# Patient Record
Sex: MALE | Race: WHITE | Employment: FULL TIME | URBAN - METROPOLITAN AREA
[De-identification: names, ages, dates, MRNs, and addresses within clinical notes are randomized per-mention and may not be internally consistent; named-entity substitution may affect disease eponyms.]

---

## 2018-01-19 ENCOUNTER — HOSPITAL ENCOUNTER (OUTPATIENT)
Age: 63
Setting detail: OBSERVATION
Discharge: HOME | End: 2018-01-19
Attending: EMERGENCY MEDICINE | Admitting: INTERNAL MEDICINE
Payer: SELF-PAY

## 2018-01-19 ENCOUNTER — APPOINTMENT (OUTPATIENT)
Dept: GENERAL RADIOLOGY | Age: 63
End: 2018-01-19
Attending: EMERGENCY MEDICINE
Payer: SELF-PAY

## 2018-01-19 VITALS
HEIGHT: 72 IN | OXYGEN SATURATION: 96 % | DIASTOLIC BLOOD PRESSURE: 70 MMHG | BODY MASS INDEX: 31.83 KG/M2 | WEIGHT: 235 LBS | HEART RATE: 54 BPM | TEMPERATURE: 97.7 F | RESPIRATION RATE: 30 BRPM | SYSTOLIC BLOOD PRESSURE: 110 MMHG

## 2018-01-19 DIAGNOSIS — R06.09 DOE (DYSPNEA ON EXERTION): ICD-10-CM

## 2018-01-19 DIAGNOSIS — R07.9 CHEST PAIN, UNSPECIFIED TYPE: Primary | ICD-10-CM

## 2018-01-19 LAB
ALBUMIN SERPL-MCNC: 3.8 G/DL (ref 3.4–5)
ALBUMIN/GLOB SERPL: 1.2 {RATIO} (ref 0.8–1.7)
ALP SERPL-CCNC: 79 U/L (ref 45–117)
ALT SERPL-CCNC: 28 U/L (ref 16–61)
ANION GAP SERPL CALC-SCNC: 1 MMOL/L (ref 3–18)
AST SERPL-CCNC: 18 U/L (ref 15–37)
BASOPHILS # BLD: 0.1 K/UL (ref 0–0.1)
BASOPHILS NFR BLD: 1 % (ref 0–2)
BILIRUB SERPL-MCNC: 0.9 MG/DL (ref 0.2–1)
BNP SERPL-MCNC: 235 PG/ML (ref 0–900)
BUN SERPL-MCNC: 17 MG/DL (ref 7–18)
BUN/CREAT SERPL: 18 (ref 12–20)
CALCIUM SERPL-MCNC: 8.8 MG/DL (ref 8.5–10.1)
CHLORIDE SERPL-SCNC: 106 MMOL/L (ref 100–108)
CK MB CFR SERPL CALC: 3.6 % (ref 0–4)
CK MB CFR SERPL CALC: ABNORMAL % (ref 0–4)
CK MB SERPL-MCNC: 1 NG/ML (ref 5–25)
CK MB SERPL-MCNC: <1 NG/ML (ref 5–25)
CK SERPL-CCNC: 28 U/L (ref 39–308)
CK SERPL-CCNC: 34 U/L (ref 39–308)
CO2 SERPL-SCNC: 34 MMOL/L (ref 21–32)
CREAT SERPL-MCNC: 0.96 MG/DL (ref 0.6–1.3)
DIFFERENTIAL METHOD BLD: ABNORMAL
EOSINOPHIL # BLD: 0.3 K/UL (ref 0–0.4)
EOSINOPHIL NFR BLD: 4 % (ref 0–5)
ERYTHROCYTE [DISTWIDTH] IN BLOOD BY AUTOMATED COUNT: 12.9 % (ref 11.6–14.5)
GLOBULIN SER CALC-MCNC: 3.3 G/DL (ref 2–4)
GLUCOSE SERPL-MCNC: 103 MG/DL (ref 74–99)
HCT VFR BLD AUTO: 50.8 % (ref 36–48)
HGB BLD-MCNC: 17.5 G/DL (ref 13–16)
INR PPP: 1 (ref 0.8–1.2)
LYMPHOCYTES # BLD: 1.6 K/UL (ref 0.9–3.6)
LYMPHOCYTES NFR BLD: 21 % (ref 21–52)
MAGNESIUM SERPL-MCNC: 2 MG/DL (ref 1.6–2.6)
MCH RBC QN AUTO: 31.8 PG (ref 24–34)
MCHC RBC AUTO-ENTMCNC: 34.4 G/DL (ref 31–37)
MCV RBC AUTO: 92.2 FL (ref 74–97)
MONOCYTES # BLD: 0.6 K/UL (ref 0.05–1.2)
MONOCYTES NFR BLD: 8 % (ref 3–10)
NEUTS SEG # BLD: 5.1 K/UL (ref 1.8–8)
NEUTS SEG NFR BLD: 66 % (ref 40–73)
PLATELET # BLD AUTO: 195 K/UL (ref 135–420)
PMV BLD AUTO: 11.6 FL (ref 9.2–11.8)
POTASSIUM SERPL-SCNC: 4.3 MMOL/L (ref 3.5–5.5)
PROT SERPL-MCNC: 7.1 G/DL (ref 6.4–8.2)
PROTHROMBIN TIME: 12.9 SEC (ref 11.5–15.2)
RBC # BLD AUTO: 5.51 M/UL (ref 4.7–5.5)
SODIUM SERPL-SCNC: 141 MMOL/L (ref 136–145)
TROPONIN I SERPL-MCNC: <0.02 NG/ML (ref 0–0.04)
TROPONIN I SERPL-MCNC: <0.02 NG/ML (ref 0–0.04)
WBC # BLD AUTO: 7.7 K/UL (ref 4.6–13.2)

## 2018-01-19 PROCEDURE — 71045 X-RAY EXAM CHEST 1 VIEW: CPT

## 2018-01-19 PROCEDURE — 93005 ELECTROCARDIOGRAM TRACING: CPT

## 2018-01-19 PROCEDURE — 85025 COMPLETE CBC W/AUTO DIFF WBC: CPT | Performed by: EMERGENCY MEDICINE

## 2018-01-19 PROCEDURE — 85610 PROTHROMBIN TIME: CPT | Performed by: EMERGENCY MEDICINE

## 2018-01-19 PROCEDURE — 74011636320 HC RX REV CODE- 636/320: Performed by: INTERNAL MEDICINE

## 2018-01-19 PROCEDURE — 82553 CREATINE MB FRACTION: CPT | Performed by: EMERGENCY MEDICINE

## 2018-01-19 PROCEDURE — 99218 HC RM OBSERVATION: CPT

## 2018-01-19 PROCEDURE — 99152 MOD SED SAME PHYS/QHP 5/>YRS: CPT

## 2018-01-19 PROCEDURE — 99285 EMERGENCY DEPT VISIT HI MDM: CPT

## 2018-01-19 PROCEDURE — 74011250637 HC RX REV CODE- 250/637: Performed by: EMERGENCY MEDICINE

## 2018-01-19 PROCEDURE — 83880 ASSAY OF NATRIURETIC PEPTIDE: CPT | Performed by: EMERGENCY MEDICINE

## 2018-01-19 PROCEDURE — 83735 ASSAY OF MAGNESIUM: CPT | Performed by: EMERGENCY MEDICINE

## 2018-01-19 PROCEDURE — 80053 COMPREHEN METABOLIC PANEL: CPT | Performed by: EMERGENCY MEDICINE

## 2018-01-19 PROCEDURE — 74011000250 HC RX REV CODE- 250: Performed by: INTERNAL MEDICINE

## 2018-01-19 PROCEDURE — 74011250636 HC RX REV CODE- 250/636: Performed by: INTERNAL MEDICINE

## 2018-01-19 RX ORDER — ASPIRIN 325 MG
325 TABLET ORAL
Status: COMPLETED | OUTPATIENT
Start: 2018-01-19 | End: 2018-01-19

## 2018-01-19 RX ORDER — BIVALIRUDIN 250 MG/5ML
0.75 INJECTION, POWDER, LYOPHILIZED, FOR SOLUTION INTRAVENOUS ONCE
Status: DISCONTINUED | OUTPATIENT
Start: 2018-01-19 | End: 2018-01-19 | Stop reason: HOSPADM

## 2018-01-19 RX ORDER — MIDAZOLAM HYDROCHLORIDE 1 MG/ML
.5-2 INJECTION, SOLUTION INTRAMUSCULAR; INTRAVENOUS
Status: DISCONTINUED | OUTPATIENT
Start: 2018-01-19 | End: 2018-01-19 | Stop reason: HOSPADM

## 2018-01-19 RX ORDER — ATORVASTATIN CALCIUM 20 MG/1
20 TABLET, FILM COATED ORAL DAILY
Qty: 30 TAB | Refills: 2 | Status: SHIPPED | OUTPATIENT
Start: 2018-01-19 | End: 2018-01-23 | Stop reason: SDUPTHER

## 2018-01-19 RX ORDER — FENTANYL CITRATE 50 UG/ML
12.5-1 INJECTION, SOLUTION INTRAMUSCULAR; INTRAVENOUS
Status: DISCONTINUED | OUTPATIENT
Start: 2018-01-19 | End: 2018-01-19 | Stop reason: HOSPADM

## 2018-01-19 RX ORDER — VERAPAMIL HYDROCHLORIDE 2.5 MG/ML
2.5-5 INJECTION, SOLUTION INTRAVENOUS ONCE
Status: COMPLETED | OUTPATIENT
Start: 2018-01-19 | End: 2018-01-19

## 2018-01-19 RX ORDER — SODIUM CHLORIDE 0.9 % (FLUSH) 0.9 %
5-10 SYRINGE (ML) INJECTION EVERY 8 HOURS
Status: DISCONTINUED | OUTPATIENT
Start: 2018-01-19 | End: 2018-01-19 | Stop reason: HOSPADM

## 2018-01-19 RX ORDER — ISOSORBIDE MONONITRATE 30 MG/1
30 TABLET, EXTENDED RELEASE ORAL DAILY
Qty: 30 TAB | Refills: 2 | Status: SHIPPED | OUTPATIENT
Start: 2018-01-19 | End: 2018-01-23 | Stop reason: SDUPTHER

## 2018-01-19 RX ORDER — HEPARIN SODIUM 1000 [USP'U]/ML
1000-10000 INJECTION, SOLUTION INTRAVENOUS; SUBCUTANEOUS
Status: DISCONTINUED | OUTPATIENT
Start: 2018-01-19 | End: 2018-01-19 | Stop reason: HOSPADM

## 2018-01-19 RX ORDER — SODIUM CHLORIDE 0.9 % (FLUSH) 0.9 %
5-10 SYRINGE (ML) INJECTION AS NEEDED
Status: DISCONTINUED | OUTPATIENT
Start: 2018-01-19 | End: 2018-01-19 | Stop reason: HOSPADM

## 2018-01-19 RX ORDER — HEPARIN SODIUM 200 [USP'U]/100ML
500 INJECTION, SOLUTION INTRAVENOUS ONCE
Status: COMPLETED | OUTPATIENT
Start: 2018-01-19 | End: 2018-01-19

## 2018-01-19 RX ORDER — LIDOCAINE HYDROCHLORIDE 10 MG/ML
1-30 INJECTION, SOLUTION EPIDURAL; INFILTRATION; INTRACAUDAL; PERINEURAL
Status: DISCONTINUED | OUTPATIENT
Start: 2018-01-19 | End: 2018-01-19 | Stop reason: HOSPADM

## 2018-01-19 RX ADMIN — HEPARIN SODIUM 3000 UNITS: 1000 INJECTION, SOLUTION INTRAVENOUS; SUBCUTANEOUS at 16:21

## 2018-01-19 RX ADMIN — NITROGLYCERIN 200 MCG: 5 INJECTION, SOLUTION INTRAVENOUS at 16:19

## 2018-01-19 RX ADMIN — VERAPAMIL HYDROCHLORIDE 5 MG: 2.5 INJECTION INTRAVENOUS at 16:19

## 2018-01-19 RX ADMIN — HEPARIN SODIUM IN SODIUM CHLORIDE 1000 UNITS: 200 INJECTION INTRAVENOUS at 16:11

## 2018-01-19 RX ADMIN — LIDOCAINE HYDROCHLORIDE 3 ML: 10 INJECTION, SOLUTION EPIDURAL; INFILTRATION; INTRACAUDAL; PERINEURAL at 16:18

## 2018-01-19 RX ADMIN — FENTANYL CITRATE 25 MCG: 50 INJECTION INTRAMUSCULAR; INTRAVENOUS at 16:18

## 2018-01-19 RX ADMIN — MIDAZOLAM HYDROCHLORIDE 1 MG: 1 INJECTION, SOLUTION INTRAMUSCULAR; INTRAVENOUS at 16:18

## 2018-01-19 RX ADMIN — IOPAMIDOL 95 ML: 612 INJECTION, SOLUTION INTRAVENOUS at 16:41

## 2018-01-19 RX ADMIN — NITROGLYCERIN 200 MCG: 5 INJECTION, SOLUTION INTRAVENOUS at 16:27

## 2018-01-19 RX ADMIN — ASPIRIN 325 MG ORAL TABLET 325 MG: 325 PILL ORAL at 12:47

## 2018-01-19 NOTE — IP AVS SNAPSHOT
[address] Patient: Dali Suarez MRN: [id number]:[id number] A check klarissa indicates which time of day the medication should be taken. My Medications START taking these medications Instructions Each Dose to Equal  
 Morning Noon Evening Bedtime  
 atorvastatin 20 mg tablet Commonly known as:  LIPITOR Your last dose was: Your next dose is: Take 1 Tab by mouth daily. 20 mg  
    
   
   
   
  
 isosorbide mononitrate ER 30 mg tablet Commonly known as:  IMDUR Your last dose was: Your next dose is: Take 1 Tab by mouth daily. 30 mg  
    
   
   
   
  
  
STOP taking these medications   
 lisinopril 5 mg tablet Commonly known as:  PRINIVIL, ZESTRIL  
   
  
 ondansetron hcl 4 mg tablet Commonly known as:  Gopi Alatorre Where to Get Your Medications Information on where to get these meds will be given to you by the nurse or doctor. ! Ask your nurse or doctor about these medications  
  atorvastatin 20 mg tablet  
 isosorbide mononitrate ER 30 mg tablet

## 2018-01-19 NOTE — H&P
Attestation signed by Libby Garcia MD at 01/19/18 1538 (Updated)   Seen and evaluated. Agree with below.     Difficult situation as patient was seen by his regular cardiologist in September with recommended heart catheterization. He had declined because of financial reasons. He now presents 3-4 months later with recurrent anginal equivalent, severe shortness of breath and dyspnea on exertion with unstable pattern.     Because of the fact that his previous cardiologist had recommended heart catheterization, I discussed with him about his options. Obviously, increasing medical therapy would be beneficial.  Because of his bradycardia, he cannot tolerate any significant increase in his beta-blocker therapy. He remains on aspirin. He should also be on aggressive statin therapy.     After lengthy discussion of 30 minutes, he wishes to proceed with heart catheterization and coronary angiography at this time. His plan was to initially wait until September 2018 when Medicare kicked in for him. However, both he and his family are concerned that he has aggressive, progressive disease in his coronary arteries. All questions were answered. Expand All Collapse All    []Hide copied text  [facility] - Consult Note     Consultation request by No admitting provider for patient encounter. for advice/opinion related to evaluating shortness of breath and dyspnea on exertion.      Date of  Admission: 1/19/2018 11:29 AM                   Primary Care Physician:  PROVIDER UNKNOWN      Assessment:      -SOB/WHIPPLE, concern for anginal equivalent with hx of CAD. Prior PCI to prox LAD in 2006. Cardiac cath in 2011 noted restenosis to prior LAD stent. Pt had successful angioplasty and PCI to vessel. Last cath 2014 showed widely patent stent. Initial Troponin negative. EKG without ischemic changes. Hemodynamics stable.    -HTN, on atenolol 25mg BID  -HLD, is supposed to be taking Lipitor 20mg  -Anxiety disorder, on Xanax  -Non-compliance d/t financial constraints, lost his insurance this past year     Primary cardiologist Dr Adam Castaneda with [facility]:      -Symptoms consistent with angina given patient's history.   -Trend enzymes.   -Serial EKGs. -Would continue BB.  -Would check lipid panel and resume statin therapy.   -Continue 81mg aspirin daily.   -Will discuss case with attending. If patient was to have cardiac cath, it would not be able to be completed until Monday. Further recs pending his evaluation.      History of Present Illness:      This is a 57 y.o. male admitted for There are no admission diagnoses documented for this encounter. .    Patient is a 54yo  male with PMHx of CAD, HTN, HLD, and anxiety who presented with worsening sob and whipple. Both have been progressing since Aug 2017. Pt saw his primary cardiologist Dr Adam Castaneda at that time for which Dr Adam Castaneda recommended patient have a repeat cardiac catheterization. Since patient does not currently have insurance he inquired to the billing office regarding cost of procedure. He decided against proceeding with cardiac cath as cost would be upwards of $75k. Pt told his cardiologist he would like to wait until Sept 2018 when his Medicare would be starting. Pt states his shortness of breath and WHIPPLE have been worsening. He also has been having \"indigestion\" type symptoms and orthopnea. Pt is taking atenolol 25mg bid. He is taking his own regimen of aspirin at 162mg in the morning and 81mg in the evening. He states he was never directed by Dr Adam Castaneda how much aspirin he should be taking. Pt states since his symptoms are worsening and his family has been adamant that he move forward with cardiac cath, he is willing to have the cath now if necessary. He would prefer to absorb the risk and try medical management if he can wait until Sept 2018 to have his cardiac cath. Pt had a cardiac cath in 2011 noting restenosis to prior LAD stent.  Pt had successful angioplasty and PCI to vessel. Pt was sent to the ED by his employer today due to worsening shortness of breath and dyspnea. 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 He does not do a lot of pushing, pulling, or lifting.      Medications:  -Atenolol 25mg BID  -Aspirin 162mg every morning and 81mg every evening.     Cardiac cath 4/27/14 Dr Dunia Mckenna:  DIAGNOSES:  1. Angiographic evidence of widely patent left anterior descending stent with no residual stenosis. 2.Angiographic evidence of 30% proximal right coronary stenosis. 3.Angiographic evidence of 20% mid left anterior descending stenosis. 4.Angiographic evidence of well-preserved left ventricular function. IMPRESSION AND RECOMMENDATIONS:  The patient has been reassured of these findings and will be continued on medical therapy and risk factor modification.     Cardiac cath 7/21/2011 Dr Dunia Mckenna:   1. Left main coronary: The left main coronary artery was patent with no significant stenosis. 2. Circumflex coronary: Circumflex vessel coursed along the   posterior atrioventricular groove, supplying flow to a moderate   caliber marginal branch. The circumflex was widely patent. There were no areas of stenosis throughout its course. 3. Left anterior descending coronary: Left anterior descending vessel coursed along the anterior intraventricular groove to the left ventricular apex. There was angiographic evidence of a 95%   in-stent stenosis of the proximal LAD coronary artery. The   remainder of the vessel was free of any significant disease. 4. Right coronary angiogram: The right coronary artery coursed along the anterior atrioventricular groove, terminating in a   moderate posterior descending branch. There was a noncritical 10% stenosis in the proximal right coronary artery. It was otherwise widely patent and free of any significant disease.     Echocardiogram 11/13/2010  NORMAL GLOBAL LEFT VENTRICULAR SYSTOLIC FUNCTION. EJECTION FRACTION IS 67%. MILDLY DILATED LEFT ATRIUM. MILD MITRAL AND PULMONIC REGURGITATION. TRACE TRICUSPID REGURGITATION. NORMAL PULMONARY ARTERY PRESSURE. PREVIOUS ECHO DATED 1/6/2006 FOR COMPARISON.     Cardiac risk factors: Known CAD, HTN, HLD        Review of Symptoms:  Except as stated above include:  Constitutional:  Negative for fevers or chills  Respiratory:  Positive for sob and sandra, negative for cough or congestion  Cardiovascular:  Positive for chest tightness and orthopnea  Gastrointestinal: positive for indigestion, negative for nausea and vomiting  Genitourinary:  Negative for hematuria or dysuria  Musculoskeletal:  Negative for weakness or falls  Neurological:  Negative for dizziness or weakness  Dermatological:  Negative for rashes or itching  Endocrinological: Negative  Psychological:  Negative for anxiety or depression      Past Medical History:           Past Medical History:   Diagnosis Date    Atrial fibrillation ([facility] [address]      Coronary stent            Social History:       Social History                Social History    Marital status:        Spouse name: N/A    Number of children: N/A    Years of education: N/A             Social History Main Topics    Smoking status: Current Every Day Smoker       Packs/day: 1.00    Smokeless tobacco: Not on file    Alcohol use 0.5 oz/week        1 Shots of liquor per week     Drug use: No    Sexual activity: Not on file           Other Topics Concern    Not on file          Social History Narrative    No narrative on file             Family History:   No family history on file.      Medications:           Allergies   Allergen Reactions    Penicillins Hives    Codeine Sulfate Rash         No current facility-administered medications for this encounter.            Current Outpatient Prescriptions   Medication Sig    lisinopril (PRINIVIL, ZESTRIL) 5 mg tablet Take 5 mg by mouth daily.     ondansetron hcl (ZOFRAN) 4 mg tablet Take 1 Tab by mouth every eight (8) hours as needed for Nausea for 10 doses.          Physical Exam:           Visit Vitals    BP (!) 152/97    Pulse (!) 53    Temp 97.7 °F (36.5 °C)    Resp 14    SpO2 97%          BP Readings from Last 3 Encounters:   01/19/18 (!) 152/97   04/27/10 111/85          Pulse Readings from Last 3 Encounters:   01/19/18 (!) 53   04/27/10 71          Wt Readings from Last 3 Encounters:   04/27/10 104.3 kg (230 lb)         General:  Awake, alert, oriented x 3  Neck:  Supple, no thyromegaly, no cardotid bruits  Lungs:  Clear to auscultation bilat, on room air, with normal effort  Heart: reg rate and rhythm  Abdomen:  Soft, non-tender  Extremities: no LE edema, atraumatic, no cyanosis  Skin: warm and dry  Neuro: no focal deficits, PERRL, EOMs intact, speech is clear  Psych: normal mood and affect      Data Review:          Recent Labs       01/19/18   1139   WBC  7.7   HGB  17.5*   HCT  50.8*   PLT  195          Recent Labs       01/19/18   1139   NA  141   K  4.3   CL  106   CO2  34*   GLU  103*   BUN  17   CREA  0.96   CA  8.8   MG  2.0   ALB  3.8   SGOT  18   ALT  28   INR  1.0               Results for orders placed or performed during the hospital encounter of 01/19/18   EKG, 12 LEAD, INITIAL   Result Value Ref Range     Ventricular Rate 54 BPM     Atrial Rate 54 BPM     P-R Interval 158 ms     QRS Duration 94 ms     Q-T Interval 418 ms     QTC Calculation (Bezet) 396 ms     Calculated P Axis 30 degrees     Calculated R Axis 18 degrees     Calculated T Axis 59 degrees     Diagnosis           Sinus bradycardia  Possible Left atrial enlargement  Cannot rule out Anterior infarct , age undetermined  Abnormal ECG  No previous ECGs available         All Cardiac Markers in the last 24 hours:           Lab Results   Component Value Date/Time     CPK 34 (L) 01/19/2018 11:39 AM     CKMB <1.0 01/19/2018 11:39 AM     CKND1   01/19/2018 11:39 AM       CALCULATION NOT PERFORMED WHEN RESULT IS BELOW LINEAR LIMIT     TROIQ <0.02 01/19/2018 11:39 AM         Last Lipid:  No results found for: CHOL, CHOLX, CHLST, CHOLV, HDL, LDL, LDLC, DLDLP, TGLX, TRIGL, TRIGP, CHHD, CHHDX     Signed By: AVANI Cohen      January 19, 2018                  Cosigned by: Wyatt Borjas MD at 01/19/18 1538

## 2018-01-19 NOTE — ED TRIAGE NOTES
Pt in from home states he has been feeling bad for the past 3 days. States he gets winded when walking up the stairs along with dizziness.

## 2018-01-19 NOTE — ROUTINE PROCESS
Right wrist D-STAT band removed, sterile dressing applied. No bleeding or swelling. Pain:0/10. Safety instructions reviewed. Normal radial pulse, normal distal circulation and neuro check.

## 2018-01-19 NOTE — PROCEDURES
CARDIAC CATHETERIZATION LABORATORY PROCEDURE REPORT    Vero Faust is a 57 y.o. male    Medical Record Number: [id number]    Primary Care Provider: PROVIDER UNKNOWN      Referral Provider: No ref. provider found    PROCEDURE DATE: 1/19/2018    INDICATIONS:   Patient with known history of CAD presented with increased shortness of breath and dyspnea on exertion which are his anginal equivalent. His pattern is unstable. MD PERFORMING PROCEDURE: [id number] Power MD    PROCEDURE:  Left heart catheterization, coronary angiography and left ventriculography. ANESTHESIA: Moderate sedation and local anesthesia. EBL: 10-50 ml  CONTRAST USE: Approximately 95 ml  RADIATION: 1060 mGy    Risks, benefits, and alternatives to the procedure were explained to the patient prior to the procedure. Questions were answered in detail. The patient appeared to understand and appropriate informed consent was obtained. The patient was brought to the cardiac catheterization  laboratory in a post-absorptive state and right wrist was prepped and draped in the usual sterile manner. Moderate sedation was achieved with a combination of Versed (midazolam) and Fentanyl. Lidocaine was used to secure local anesthesia. The right radial artery was cannulated using a Micro-puncture kit and a 6 Syriac sheath was inserted. The patient received 3000 units of IV Heparin bolus as well as 5 mg Verapamil and 200 microgram NTG through the sheath to prevent arterial vasospasm. 6 [facility] catheter was used to obtain selective bilateral coronary angiograms, under fluoroscopic guidance,  in multiple projections. LV angiography was performed in the LOPEZ projection with a hand injection. Pressures were recorded in the LV and during pull back across the aortic valve. The following were observed. FLUOROSCOPY: There was mild to moderate significant calcification.     HEMODYNAMIC / ANGIOGRAPHIC DATA  · Left ventricle: End diastolic pressure was 19 mmHg.  Left ventriculography: The EF was estimated to be around 70 %. There was no diagnostic segmental wall motion abnormality. · Aorta: There was no significant systolic pressure gradient across the aortic valve. · Mitral valve: There was no mitral regurgitation. · Coronary Angiography:  · The coronary circulation was right dominant. · Left main: Angiographically normal.   · Left anterior descending: Diffuse 20-30%. The previous stented segment is patent with mild ISR of 30%. There is normal flow. .  · Ramus Intermediate: Angiographically normal.  · Diagonal Branches: Angiographically normal.  · Circumflex: Angiographically normal.  · Obtuse Marginal Branches: Inferior branch of the obtuse marginal branch has an ostial 60% stenosis with normal flow. This did not appear unstable. · Right coronary: Proximal 50% stenosis, focal distal 30% stenosis. The patient was transferred to the observation area with stable vital signs and in an asymptomatic state. The sheath will be pulled and hemostasis will be secured with the application of pressure. There was no apparent immediate complication. SUMMARY: Mild to moderate disease involving small branch of the obtuse marginal branch and RCA. No unstable appearing lesions noted at this time. I will continue medical therapy. I would decrease his atenolol in light of heart rate of 51 bpm from 50 mg daily down to 25 mg daily. I would also initiate Imdur 30 mg daily. Lastly, I have initiated atorvastatin 20 mg daily. He will need to see his cardiologist within next 4 weeks. Moderate sedation was utilized for 22 minutes using Versed and fentanyl under my supervision. RECOMMENDATIONS:  Post-procedure care will focus on aggressive risk factor modification.      Electronically signed: Melo Grubbs MD, [facility]

## 2018-01-19 NOTE — DISCHARGE INSTRUCTIONS
Cardiac Catheterization/Angiography Discharge Instructions    *Check the puncture site frequently for swelling or bleeding. If you see any bleeding, lie down and apply pressure over the area with a clean town or washcloth. Notify your doctor for any redness, swelling, drainage or oozing from the puncture site. Notify your doctor for any fever or chills. *If the leg or arm with the puncture becomes cold, numb or painful, call Dr Yarelis Brown MD at  [phone]. *Activity should be limited for the next 48 hours. Climb stairs as little as possible and avoid any stooping, bending or strenuous activity for 48 hours. No heavy lifting (anything over 10 pounds) for three days. *Do not drive for 48 hours. *You may resume your usual diet. Drink more fluids than usual.    *Have a responsible person drive you home and stay with you for at least 24 hours after your heart catheterization/angiography. *You may remove the bandage from your Right Wrist in 24 hours. You may shower in 24 hours. No tub baths, hot tubs or swimming for one week. Do not place any lotions, creams, powders, ointments over the puncture site for one week. You may place a clean band-aid over the puncture site each day for 5 days. Change this daily. DISCHARGE SUMMARY from Nurse    PATIENT INSTRUCTIONS:    After general anesthesia or intravenous sedation, for 24 hours or while taking prescription Narcotics:  · Limit your activities  · Do not drive and operate hazardous machinery  · Do not make important personal or business decisions  · Do  not drink alcoholic beverages  · If you have not urinated within 8 hours after discharge, please contact your surgeon on call.     Report the following to your surgeon:  · Excessive pain, swelling, redness or odor of or around the surgical area  · Temperature over 100.5  · Nausea and vomiting lasting longer than 4 hours or if unable to take medications  · Any signs of decreased circulation or nerve impairment to extremity: change in color, persistent  numbness, tingling, coldness or increase pain  · Any questions    What to do at Home:  Recommended activity: No lifting, Driving, or Strenuous exercise for 24 hours. If you experience any of the following symptoms bleeding,swelling,acute pain or numbness, fever, please follow up with Dr. Juan R Camacho MD @ [phone]. *  Please give a list of your current medications to your Primary Care Provider. *  Please update this list whenever your medications are discontinued, doses are      changed, or new medications (including over-the-counter products) are added. *  Please carry medication information at all times in case of emergency situations. These are general instructions for a healthy lifestyle:    No smoking/ No tobacco products/ Avoid exposure to second hand smoke  Surgeon General's Warning:  Quitting smoking now greatly reduces serious risk to your health. Obesity, smoking, and sedentary lifestyle greatly increases your risk for illness    A healthy diet, regular physical exercise & weight monitoring are important for maintaining a healthy lifestyle    You may be retaining fluid if you have a history of heart failure or if you experience any of the following symptoms:  Weight gain of 3 pounds or more overnight or 5 pounds in a week, increased swelling in our hands or feet or shortness of breath while lying flat in bed. Please call your doctor as soon as you notice any of these symptoms; do not wait until your next office visit. Recognize signs and symptoms of STROKE:    F-face looks uneven    A-arms unable to move or move unevenly    S-speech slurred or non-existent    T-time-call 911 as soon as signs and symptoms begin-DO NOT go       Back to bed or wait to see if you get better-TIME IS BRAIN. Warning Signs of HEART ATTACK     Call 911 if you have these symptoms:   Chest discomfort.  Most heart attacks involve discomfort in the center of the chest that lasts more than a few minutes, or that goes away and comes back. It can feel like uncomfortable pressure, squeezing, fullness, or pain.  Discomfort in other areas of the upper body. Symptoms can include pain or discomfort in one or both arms, the back, neck, jaw, or stomach.  Shortness of breath with or without chest discomfort.  Other signs may include breaking out in a cold sweat, nausea, or lightheadedness. Don't wait more than five minutes to call 911 - MINUTES MATTER! Fast action can save your life. Calling 911 is almost always the fastest way to get lifesaving treatment. Emergency Medical Services staff can begin treatment when they arrive -- up to an hour sooner than if someone gets to the hospital by car. The discharge information has been reviewed with the patient. The patient verbalized understanding. Discharge medications reviewed with the patient and appropriate educational materials and side effects teaching were provided. Patient armband removed and shredded  MyChart Activation    Thank you for requesting access to [employer]. Please follow the instructions below to securely access and download your online medical record. [employer] allows you to send messages to your doctor, view your test results, renew your prescriptions, schedule appointments, and more. How Do I Sign Up? 1. In your internet browser, go to [email or web address]. 2. Click on the First Time User? Click Here link in the Sign In box. You will see the New Member Sign Up page. 3. Enter your [employer] Access Code exactly as it appears below. You will not need to use this code after youve completed the sign-up process. If you do not sign up before the expiration date, you must request a new code. [employer] Access Code: [email or web address]  Expires: 2018  3:44 PM (This is the date your [employer] access code will )    4.  Enter the last four digits of your Social Security Number (xxxx) and Date of Birth (mm/dd/yyyy) as indicated and click Submit. You will be taken to the next sign-up page. 5. Create a [employer] ID. This will be your [employer] login ID and cannot be changed, so think of one that is secure and easy to remember. 6. Create a [employer] password. You can change your password at any time. 7. Enter your Password Reset Question and Answer. This can be used at a later time if you forget your password. 8. Enter your e-mail address. You will receive e-mail notification when new information is available in [address] 9. Click Sign Up. You can now view and download portions of your medical record. 10. Click the Download Summary menu link to download a portable copy of your medical information. Additional Information    If you have questions, please visit the Frequently Asked Questions section of the [employer] website at [email or web address] Remember, [employer] is NOT to be used for urgent needs.  For medical emergencies, dial 911.    ___________________________________________________________________________________________________________________________________

## 2018-01-19 NOTE — ROUTINE PROCESS
A+Ox4, denied any complaints, right wrist dressing intact, no bleeding or swelling. Pain:0/10. Normal radial pulse, normal distal circulation and neuro check. Escorted to car, tot his son for transport home.

## 2018-01-19 NOTE — IP AVS SNAPSHOT
[address] Patient: Emma Perez MRN: [id number] About your hospitalization You were admitted on:  January 19, 2018 You last received care in the:  [facility] You were discharged on:  January 19, 2018 Why you were hospitalized Your primary diagnosis was:  Not on File Your diagnoses also included:  Chest Pain Follow-up Information Follow up With Details Comments Contact Info Provider Unknown   Patient not available to ask Discharge Orders None A check klarissa indicates which time of day the medication should be taken. My Medications START taking these medications Instructions Each Dose to Equal  
 Morning Noon Evening Bedtime  
 atorvastatin 20 mg tablet Commonly known as:  LIPITOR Your last dose was: Your next dose is: Take 1 Tab by mouth daily. 20 mg  
    
   
   
   
  
 isosorbide mononitrate ER 30 mg tablet Commonly known as:  IMDUR Your last dose was: Your next dose is: Take 1 Tab by mouth daily. 30 mg  
    
   
   
   
  
  
STOP taking these medications   
 lisinopril 5 mg tablet Commonly known as:  PRINIVIL, ZESTRIL  
   
  
 ondansetron hcl 4 mg tablet Commonly known as:  Sharri Shown Where to Get Your Medications Information on where to get these meds will be given to you by the nurse or doctor. ! Ask your nurse or doctor about these medications  
  atorvastatin 20 mg tablet  
 isosorbide mononitrate ER 30 mg tablet Discharge Instructions Cardiac Catheterization/Angiography Discharge Instructions *Check the puncture site frequently for swelling or bleeding.  If you see any bleeding, lie down and apply pressure over the area with a clean town or washcloth. Notify your doctor for any redness, swelling, drainage or oozing from the puncture site. Notify your doctor for any fever or chills. *If the leg or arm with the puncture becomes cold, numb or painful, call Dr Greg Gutierrez MD at  [phone]. *Activity should be limited for the next 48 hours. Climb stairs as little as possible and avoid any stooping, bending or strenuous activity for 48 hours. No heavy lifting (anything over 10 pounds) for three days. *Do not drive for 48 hours. *You may resume your usual diet. Drink more fluids than usual. 
 
*Have a responsible person drive you home and stay with you for at least 24 hours after your heart catheterization/angiography. *You may remove the bandage from your Right Wrist in 24 hours. You may shower in 24 hours. No tub baths, hot tubs or swimming for one week. Do not place any lotions, creams, powders, ointments over the puncture site for one week. You may place a clean band-aid over the puncture site each day for 5 days. Change this daily. DISCHARGE SUMMARY from Nurse PATIENT INSTRUCTIONS: 
 
 
F-face looks uneven A-arms unable to move or move unevenly S-speech slurred or non-existent T-time-call 911 as soon as signs and symptoms begin-DO NOT go Back to bed or wait to see if you get better-TIME IS BRAIN. Warning Signs of HEART ATTACK Call 911 if you have these symptoms: 
? Chest discomfort. Most heart attacks involve discomfort in the center of the chest that lasts more than a few minutes, or that goes away and comes back. It can feel like uncomfortable pressure, squeezing, fullness, or pain. ? Discomfort in other areas of the upper body.  Symptoms can include pain or discomfort in one or both arms, the back, neck, jaw, or stomach. ? Shortness of breath with or without chest discomfort. ? Other signs may include breaking out in a cold sweat, nausea, or lightheadedness. Don't wait more than five minutes to call [address]! Fast action can save your life. Calling 911 is almost always the fastest way to get lifesaving treatment. Emergency Medical Services staff can begin treatment when they arrive  up to an hour sooner than if someone gets to the hospital by car. The discharge information has been reviewed with the patient. The patient verbalized understanding. Discharge medications reviewed with the patient and appropriate educational materials and side effects teaching were provided. Patient armband removed and shredded MyChart Activation Thank you for requesting access to Partly. Please follow the instructions below to securely access and download your online medical record. Partly allows you to send messages to your doctor, view your test results, renew your prescriptions, schedule appointments, and more. How Do I Sign Up? 1. In your internet browser, go to [email or web address] [email or web address]. 2. Click on the First Time User? Click Here link in the Sign In box. You will see the New Member Sign Up page. 3. Enter your Partly Access Code exactly as it appears below. You will not need to use this code after youve completed the sign-up process. If you do not sign up before the expiration date, you must request a new code. Partly Access Code: [email or web address] Expires: 2018  3:44 PM (This is the date your Partly access code will ) 4. Enter the last four digits of your Social Security Number (xxxx) and Date of Birth (mm/dd/yyyy) as indicated and click Submit. You will be taken to the next sign-up page. 5. Create a Partly ID.  This will be your Partly login ID and cannot be changed, so think of one that is secure and easy to remember. 6. Create a CleanScapes password. You can change your password at any time. 7. Enter your Password Reset Question and Answer. This can be used at a later time if you forget your password. 8. Enter your e-mail address. You will receive e-mail notification when new information is available in [facility] 9. Click Sign Up. You can now view and download portions of your medical record. 10. Click the Download Summary menu link to download a portable copy of your medical information. Additional Information If you have questions, please visit the Frequently Asked Questions section of the CleanScapes website at [email or web address] Remember, CleanScapes is NOT to be used for urgent needs. For medical emergencies, dial 911. 
 
___________________________________________________________________________________________________________________________________ Introducing [facility]! [facility] introduces CleanScapes patient portal. Now you can access parts of your medical record, email your doctor's office, and request medication refills online. 1. In your internet browser, go to [email or web address] 2. Click on the First Time User? Click Here link in the Sign In box. You will see the New Member Sign Up page. 3. Enter your CleanScapes Access Code exactly as it appears below. You will not need to use this code after youve completed the sign-up process. If you do not sign up before the expiration date, you must request a new code. · CleanScapes Access Code: [email or web address] Expires: 4/19/2018  3:44 PM 
 
4. Enter the last four digits of your Social Security Number (xxxx) and Date of Birth (mm/dd/yyyy) as indicated and click Submit. You will be taken to the next sign-up page. 5. Create a MyChart ID.  This will be your WeHack.Ithart login ID and cannot be changed, so think of one that is secure and easy to remember. 6. Create a Ruxter password. You can change your password at any time. 7. Enter your Password Reset Question and Answer. This can be used at a later time if you forget your password. 8. Enter your e-mail address. You will receive e-mail notification when new information is available in [address] 9. Click Sign Up. You can now view and download portions of your medical record. 10. Click the Download Summary menu link to download a portable copy of your medical information. If you have questions, please visit the Frequently Asked Questions section of the Ruxter website. Remember, Ruxter is NOT to be used for urgent needs. For medical emergencies, dial 911. Now available from your iPhone and Android! Unresulted Labs-Please follow up with your PCP about these lab tests Order Current Status EKG, 12 LEAD, INITIAL Preliminary result Providers Seen During Your Hospitalization Provider Specialty Primary office phone Joseph Wilson MD Emergency Medicine [phone] Cj Power MD Cardiology [phone] Your Primary Care Physician (PCP) Primary Care Physician Office Phone Office Fax UNKNOWN, PROVIDER ** None ** ** None ** You are allergic to the following Allergen Reactions Penicillins Hives Codeine Sulfate Rash Recent Documentation Height Weight BMI  
  
  
 1.829 m 106.6 kg 31.87 kg/m2 Emergency Contacts Name Discharge Info Relation Home Work Mobile Josette Paline DISCHARGE CAREGIVER [3] Spouse [3] [phone] Patient Belongings The following personal items are in your possession at time of discharge: 
     Visual Aid: None Please provide this summary of care documentation to your next provider.  
  
  
 
  
Signatures-by signing, you are acknowledging that this After Visit Summary has been reviewed with you and you have received a copy. Patient Signature:  ____________________________________________________________ Date:  ____________________________________________________________  
  
[place] Provider Signature:  ____________________________________________________________ Date:  ____________________________________________________________

## 2018-01-19 NOTE — IP AVS SNAPSHOT
Summary of Care Report The Summary of Care report has been created to help improve care coordination. Users with access to [employer]News or [address] (Web-based application) may access additional patient information including the Discharge Summary. If you are not currently a [address] user and need more information, please call the number listed below in the [place] [address] section and ask to be connected with Medical Records. Facility Information Name Address Phone [place] [id number] [facility] [address] [phone] Patient Information Patient Name Sex  Miah Fernandes ([id number]) Male 1955 Discharge Information Admitting Provider Service Area Unit Catalina Cardona MD / [address] [place] [address]  / [phone] Discharge Provider Discharge Date/Time Discharge Disposition Destination (none) 2018 19:00 (Pending) AHR (none) Patient Language Language ENGLISH [13] Hospital Problems as of 2018  Never Reviewed Class Noted - Resolved Last Modified POA Active Problems Chest pain  2018 - Present 2018 by Catalina Cardona MD Unknown Entered by Catalina Cardona MD  
  
You are allergic to the following Allergen Reactions Penicillins Hives Codeine Sulfate Rash Current Discharge Medication List  
  
START taking these medications Dose & Instructions Dispensing Information Comments  
 atorvastatin 20 mg tablet Commonly known as:  LIPITOR Dose:  20 mg Take 1 Tab by mouth daily. Quantity:  30 Tab Refills:  2  
   
 isosorbide mononitrate ER 30 mg tablet Commonly known as:  IMDUR Dose:  30 mg Take 1 Tab by mouth daily. Quantity:  30 Tab Refills:  2 STOP taking these medications Comments  
 lisinopril 5 mg tablet Commonly known as:  PRINIVIL, ZESTRIL  
   
   
 ondansetron hcl 4 mg tablet Commonly known as:  Soni Billingsley Follow-up Information Follow up With Details Comments Contact Info Provider Unknown   Patient not available to ask Discharge Instructions Cardiac Catheterization/Angiography Discharge Instructions *Check the puncture site frequently for swelling or bleeding. If you see any bleeding, lie down and apply pressure over the area with a clean town or washcloth. Notify your doctor for any redness, swelling, drainage or oozing from the puncture site. Notify your doctor for any fever or chills. *If the leg or arm with the puncture becomes cold, numb or painful, call Dr Malu Crowley MD at  [phone]. *Activity should be limited for the next 48 hours. Climb stairs as little as possible and avoid any stooping, bending or strenuous activity for 48 hours. No heavy lifting (anything over 10 pounds) for three days. *Do not drive for 48 hours. *You may resume your usual diet. Drink more fluids than usual. 
 
*Have a responsible person drive you home and stay with you for at least 24 hours after your heart catheterization/angiography. *You may remove the bandage from your Right Wrist in 24 hours. You may shower in 24 hours. No tub baths, hot tubs or swimming for one week. Do not place any lotions, creams, powders, ointments over the puncture site for one week. You may place a clean band-aid over the puncture site each day for 5 days. Change this daily. DISCHARGE SUMMARY from Nurse PATIENT INSTRUCTIONS: 
 
 
F-face looks uneven A-arms unable to move or move unevenly S-speech slurred or non-existent T-time-call 911 as soon as signs and symptoms begin-DO NOT go Back to bed or wait to see if you get better-TIME IS BRAIN. Warning Signs of HEART ATTACK Call 911 if you have these symptoms: 
? Chest discomfort. Most heart attacks involve discomfort in the center of the chest that lasts more than a few minutes, or that goes away and comes back. It can feel like uncomfortable pressure, squeezing, fullness, or pain. ? Discomfort in other areas of the upper body. Symptoms can include pain or discomfort in one or both arms, the back, neck, jaw, or stomach. ? Shortness of breath with or without chest discomfort. ? Other signs may include breaking out in a cold sweat, nausea, or lightheadedness. Don't wait more than five minutes to call [address]! Fast action can save your life. Calling 911 is almost always the fastest way to get lifesaving treatment. Emergency Medical Services staff can begin treatment when they arrive  up to an hour sooner than if someone gets to the hospital by car. The discharge information has been reviewed with the patient. The patient verbalized understanding. Discharge medications reviewed with the patient and appropriate educational materials and side effects teaching were provided. Patient armband removed and shredded MyChart Activation Thank you for requesting access to Infinity Box. Please follow the instructions below to securely access and download your online medical record. Infinity Box allows you to send messages to your doctor, view your test results, renew your prescriptions, schedule appointments, and more. How Do I Sign Up? 1. In your internet browser, go to [email or web address]. 2. Click on the First Time User? Click Here link in the Sign In box. You will see the New Member Sign Up page. 3. Enter your Infinity Box Access Code exactly as it appears below.  You will not need to use this code after youve completed the sign-up process. If you do not sign up before the expiration date, you must request a new code. Music[employer] Access Code: [email or web address] Expires: 2018  3:44 PM (This is the date your MusicPlay Analytics access code will ) 4. Enter the last four digits of your Social Security Number (xxxx) and Date of Birth (mm/dd/yyyy) as indicated and click Submit. You will be taken to the next sign-up page. 5. Create a MusicPlay Analytics ID. This will be your MusicPlay Analytics login ID and cannot be changed, so think of one that is secure and easy to remember. 6. Create a MusicPlay Analytics password. You can change your password at any time. 7. Enter your Password Reset Question and Answer. This can be used at a later time if you forget your password. 8. Enter your e-mail address. You will receive e-mail notification when new information is available in [address] 9. Click Sign Up. You can now view and download portions of your medical record. 10. Click the Download Summary menu link to download a portable copy of your medical information. Additional Information If you have questions, please visit the Frequently Asked Questions section of the MusicPlay Analytics website at [email or web address] Remember, MusicPlay Analytics is NOT to be used for urgent needs. For medical emergencies, dial 911. 
 
___________________________________________________________________________________________________________________________________ Chart Review Routing History No Routing History on File

## 2018-01-19 NOTE — ED PROVIDER NOTES
EMERGENCY DEPARTMENT HISTORY AND PHYSICAL EXAM    12:10 PM      Date: 1/19/2018  Patient Name: Tip Delgado    History of Presenting Illness     Chief Complaint   Patient presents with    Dizziness         History Provided By: Patient    Additional History (Context): 12:10 PM Tip Delgado is a 57 y.o. male with h/o Coronary Stents and A-FIb who presents to ED complaining of gradually worsening moderate SOB exacerbated with walking long periods associated with lightheadedness, mild cough, mild HA, generalized weakness and fatigue onset several months ago but worsening the past 5 days. Patient states that he was helping his daughter move into her apartment when the sx began getting worse. He has a hx of a 100% LAD found several years ago viz Cardiac cath. He had multiple stress test done that were all unremarkable but a LAD was found on the Cath. Dr. Michael Davis operated on the patient and put in stents. A couple years later he had to have the stents removed and replaced again for another blockage. He was put on Plavix for a year, and instructed to take 25mg BID Tenolol. Denies chills or fever. He was told by Dr. Michael Davis in September when the sx started again that he would need another cath. He has been unable to get the Cath due to losing his insurance and states he has been trying to wait until September 2018 to get the cath but his sx have worsened as patient states he can only make it about 2 flights of stairs before being SOB. No other concerns or symptoms at this time. PCP: PROVIDER UNKNOWN    Chief Complaint: SOB  Duration:Several  Months  Timing:  Gradual and Worsening  Location: N/A  Quality: n/a  Severity: Moderate  Modifying Factors: Exacerbated with walking long periods. Associated Symptoms: lightheadedness, cough, ha, weakness, and fatigue      Current Outpatient Prescriptions   Medication Sig Dispense Refill    lisinopril (PRINIVIL, ZESTRIL) 5 mg tablet Take 5 mg by mouth daily.       ondansetron hcl (ZOFRAN) 4 mg tablet Take 1 Tab by mouth every eight (8) hours as needed for Nausea for 10 doses. 8 Tab 1       Past History     Past Medical History:  Past Medical History:   Diagnosis Date    Atrial fibrillation ([address]     Coronary stent        Past Surgical History:  Past Surgical History:   Procedure Laterality Date    CHEST TUBE      HX CORONARY STENT PLACEMENT      HX HERNIA REPAIR         Family History:  No family history on file. Social History:  Social History   Substance Use Topics    Smoking status: Current Every Day Smoker     Packs/day: 1.00    Smokeless tobacco: Not on file    Alcohol use 0.5 oz/week     1 Shots of liquor per week       Allergies: Allergies   Allergen Reactions    Penicillins Hives    Codeine Sulfate Rash         Review of Systems     Review of Systems   Constitutional: Positive for fatigue. Respiratory: Positive for cough and shortness of breath. Neurological: Positive for weakness, light-headedness and headaches. All other systems reviewed and are negative. Physical Exam   There were no vitals taken for this visit. Physical Exam   Constitutional: He is oriented to person, place, and time. He appears well-developed. HENT:   Head: Normocephalic and atraumatic. Eyes: EOM are normal. Pupils are equal, round, and reactive to light. Neck: Normal range of motion. Neck supple. Cardiovascular: Normal rate, regular rhythm and normal heart sounds. Exam reveals no friction rub. No murmur heard. Pulmonary/Chest: Effort normal and breath sounds normal. No respiratory distress. He has no wheezes. Abdominal: Soft. He exhibits no distension. There is no tenderness. There is no rebound and no guarding. Musculoskeletal: Normal range of motion. Neurological: He is alert and oriented to person, place, and time. Skin: Skin is warm and dry. Psychiatric: He has a normal mood and affect.  His behavior is normal. Thought content normal. Diagnostic Study Results   EKG shows sinus bradycardia at 54 with a normal axis and normal intervals there is no ST elevation or depression or hypertrophy  cxr napd    Cath 2014. DIAGNOSES:  1. Angiographic evidence of widely patent left anterior descending stent with no residual stenosis. 2.Angiographic evidence of 30% proximal right coronary stenosis. 3.Angiographic evidence of 20% mid left anterior descending stenosis. 4.Angiographic evidence of well-preserved left ventricular function. IMPRESSION AND RECOMMENDATIONS:  The patient has been reassured of these findings and will be continued on medical therapy and risk factor modification. Cath 2011   DIAGNOSES:   1. Angiographic evidence of 95% in-stent stenosis of proximal   left anterior descending coronary. 2. Status post successful intervention with placement of   non-medicated Xience stent with excellent result and final   residual stenosis reduced to 0%. No echo or stress since 2011    Medical Decision Making      3 78-year-old gentleman who has been avoiding a cardiac catheterization due to insurance issues presents with worsening shortness of breath on exertion. He has had similar symptoms in the past, where cath showed an occluded and then re-occluded LAD. Seen by cardiology; went for cath. .        Diagnosis     No diagnosis found. _______________________________    Attestations:  Rod Ruvalcaba acting as a scribe for and in the presence of Boubacar Humphrey MD      January 19, 2018 at 12:10 PM       Provider Attestation:      I personally performed the services described in the documentation, reviewed the documentation, as recorded by the scribe in my presence, and it accurately and completely records my words and actions.  January 19, 2018 at 12:10 PM - Boubacar Humphrey MD    _______________________________

## 2018-01-19 NOTE — ROUTINE PROCESS
TRANSFER - IN REPORT:    Verbal report received from IVON CARDONA. (name) on Hanna Flavin  being received from [facility] (unit) for ordered procedure      Report consisted of patients Situation, Background, Assessment and   Recommendations(SBAR). Information from the following report(s) SBAR, Procedure Summary, Intake/Output and MAR was reviewed with the receiving nurse. Opportunity for questions and clarification was provided. Assessment completed upon patients arrival to unit and care assumed.

## 2018-01-19 NOTE — CONSULTS
[facility] - Consult Note    Consultation request by No admitting provider for patient encounter. for advice/opinion related to evaluating shortness of breath and dyspnea on exertion. Date of  Admission: 1/19/2018 11:29 AM   Primary Care Physician:  PROVIDER UNKNOWN     Assessment:     -SOB/WHIPPLE, concern for anginal equivalent with hx of CAD. Prior PCI to prox LAD in 2006. Cardiac cath in 2011 noted restenosis to prior LAD stent. Pt had successful angioplasty and PCI to vessel. Last cath 2014 showed widely patent stent. Initial Troponin negative. EKG without ischemic changes. Hemodynamics stable. -HTN, on atenolol 25mg BID  -HLD, is supposed to be taking Lipitor 20mg  -Anxiety disorder, on Xanax  -Non-compliance d/t financial constraints, lost his insurance this past year    Primary cardiologist Dr Ethel Johnson with [address]:     -Symptoms consistent with angina given patient's history.   -Trend enzymes.   -Serial EKGs. -Would continue BB.  -Would check lipid panel and resume statin therapy.   -Continue 81mg aspirin daily.   -Will discuss case with attending. If patient was to have cardiac cath, it would not be able to be completed until Monday. Further recs pending his evaluation. History of Present Illness: This is a 57 y.o. male admitted for There are no admission diagnoses documented for this encounter. .    Patient is a 54yo  male with PMHx of CAD, HTN, HLD, and anxiety who presented with worsening sob and whipple. Both have been progressing since Aug 2017. Pt saw his primary cardiologist Dr Ethel Johnson at that time for which Dr Ethel Johnson recommended patient have a repeat cardiac catheterization. Since patient does not currently have insurance he inquired to the billing office regarding cost of procedure. He decided against proceeding with cardiac cath as cost would be upwards of $75k.  Pt told his cardiologist he would like to wait until Sept 2018 when his Medicare would be starting. Pt states his shortness of breath and WHIPPLE have been worsening. He also has been having \"indigestion\" type symptoms and orthopnea. Pt is taking atenolol 25mg bid. He is taking his own regimen of aspirin at 162mg in the morning and 81mg in the evening. He states he was never directed by Dr Patricia Myles how much aspirin he should be taking. Pt states since his symptoms are worsening and his family has been adamant that he move forward with cardiac cath, he is willing to have the cath now if necessary. He would prefer to absorb the risk and try medical management if he can wait until Sept 2018 to have his cardiac cath. Pt had a cardiac cath in 2011 noting restenosis to prior LAD stent. Pt had successful angioplasty and PCI to vessel. Pt was sent to the ED by his employer today due to worsening shortness of breath and dyspnea. 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 He does not do a lot of pushing, pulling, or lifting. Medications:  -Atenolol 25mg BID  -Aspirin 162mg every morning and 81mg every evening. Cardiac cath 4/27/14 Dr Jerry Garrett:  DIAGNOSES:  1. Angiographic evidence of widely patent left anterior descending stent with no residual stenosis. 2.Angiographic evidence of 30% proximal right coronary stenosis. 3.Angiographic evidence of 20% mid left anterior descending stenosis. 4.Angiographic evidence of well-preserved left ventricular function. IMPRESSION AND RECOMMENDATIONS:  The patient has been reassured of these findings and will be continued on medical therapy and risk factor modification. Cardiac cath 7/21/2011 Dr Jerry Garrett:   1. Left main coronary: The left main coronary artery was patent with no significant stenosis. 2. Circumflex coronary: Circumflex vessel coursed along the   posterior atrioventricular groove, supplying flow to a moderate   caliber marginal branch. The circumflex was widely patent. There were no areas of stenosis throughout its course.    3. Left anterior descending coronary: Left anterior descending vessel coursed along the anterior intraventricular groove to the left ventricular apex. There was angiographic evidence of a 95%   in-stent stenosis of the proximal LAD coronary artery. The   remainder of the vessel was free of any significant disease. 4. Right coronary angiogram: The right coronary artery coursed along the anterior atrioventricular groove, terminating in a   moderate posterior descending branch. There was a noncritical 10% stenosis in the proximal right coronary artery. It was otherwise widely patent and free of any significant disease. Echocardiogram 11/13/2010  NORMAL GLOBAL LEFT VENTRICULAR SYSTOLIC FUNCTION. EJECTION FRACTION IS 67%. MILDLY DILATED LEFT ATRIUM. MILD MITRAL AND PULMONIC REGURGITATION. TRACE TRICUSPID REGURGITATION. NORMAL PULMONARY ARTERY PRESSURE. PREVIOUS ECHO DATED 1/6/2006 FOR COMPARISON.     Cardiac risk factors: Known CAD, HTN, HLD      Review of Symptoms:  Except as stated above include:  Constitutional:  Negative for fevers or chills  Respiratory:  Positive for sob and sandra, negative for cough or congestion  Cardiovascular:  Positive for chest tightness and orthopnea  Gastrointestinal: positive for indigestion, negative for nausea and vomiting  Genitourinary:  Negative for hematuria or dysuria  Musculoskeletal:  Negative for weakness or falls  Neurological:  Negative for dizziness or weakness  Dermatological:  Negative for rashes or itching  Endocrinological: Negative  Psychological:  Negative for anxiety or depression     Past Medical History:     Past Medical History:   Diagnosis Date    [facility]     Coronary stent          Social History:     Social History     Social History    Marital status:      Spouse name: N/A    Number of children: N/A    Years of education: N/A     Social History Main Topics    Smoking status: Current Every Day Smoker     Packs/day: 1.00    Smokeless tobacco: Not on file    Alcohol use 0.5 oz/week     1 Shots of liquor per week    Drug use: No    Sexual activity: Not on file     Other Topics Concern    Not on file     Social History Narrative    No narrative on file        Family History:   No family history on file. Medications: Allergies   Allergen Reactions    Penicillins Hives    Codeine Sulfate Rash        No current facility-administered medications for this encounter. Current Outpatient Prescriptions   Medication Sig    lisinopril (PRINIVIL, ZESTRIL) 5 mg tablet Take 5 mg by mouth daily.  ondansetron hcl (ZOFRAN) 4 mg tablet Take 1 Tab by mouth every eight (8) hours as needed for Nausea for 10 doses.          Physical Exam:     Visit Vitals    BP (!) 152/97    Pulse (!) 53    Temp 97.7 °F (36.5 °C)    Resp 14    SpO2 97%     BP Readings from Last 3 Encounters:   01/19/18 (!) 152/97   04/27/10 111/85     Pulse Readings from Last 3 Encounters:   01/19/18 (!) 53   04/27/10 71     Wt Readings from Last 3 Encounters:   04/27/10 104.3 kg (230 lb)       General:  Awake, alert, oriented x 3  Neck:  Supple, no thyromegaly, no cardotid bruits  Lungs:  Clear to auscultation bilat, on room air, with normal effort  Heart: reg rate and rhythm  Abdomen:  Soft, non-tender  Extremities: no LE edema, atraumatic, no cyanosis  Skin: warm and dry  Neuro: no focal deficits, PERRL, EOMs intact, speech is clear  Psych: normal mood and affect     Data Review:     Recent Labs      01/19/18   1139   WBC  7.7   HGB  17.5*   HCT  50.8*   PLT  195     Recent Labs      01/19/18   1139   NA  141   K  4.3   CL  106   CO2  34*   GLU  103*   BUN  17   CREA  0.96   CA  8.8   MG  2.0   ALB  3.8   SGOT  18   ALT  28   INR  1.0       Results for orders placed or performed during the hospital encounter of 01/19/18   EKG, 12 LEAD, INITIAL   Result Value Ref Range    Ventricular Rate 54 BPM    Atrial Rate 54 BPM    P-R Interval 158 ms    QRS Duration 94 ms    Q-T Interval 418 ms    QTC Calculation (Bezet) 396 ms    Calculated P Axis 30 degrees    Calculated R Axis 18 degrees    Calculated T Axis 59 degrees    Diagnosis       Sinus bradycardia  Possible Left atrial enlargement  Cannot rule out Anterior infarct , age undetermined  Abnormal ECG  No previous ECGs available         All Cardiac Markers in the last 24 hours:    Lab Results   Component Value Date/Time    CPK 34 (L) 01/19/2018 11:39 AM    CKMB <1.0 01/19/2018 11:39 AM    CKND1  01/19/2018 11:39 AM     CALCULATION NOT PERFORMED WHEN RESULT IS BELOW LINEAR LIMIT    TROIQ <0.02 01/19/2018 11:39 AM       Last Lipid:  No results found for: CHOL, CHOLX, CHLST, CHOLV, HDL, LDL, LDLC, DLDLP, TGLX, TRIGL, TRIGP, CHHD, CHHDX    Signed By: AVANI Glass     January 19, 2018

## 2018-01-20 LAB
ATRIAL RATE: 52 BPM
ATRIAL RATE: 54 BPM
CALCULATED P AXIS, ECG09: 30 DEGREES
CALCULATED P AXIS, ECG09: 42 DEGREES
CALCULATED R AXIS, ECG10: 18 DEGREES
CALCULATED R AXIS, ECG10: 28 DEGREES
CALCULATED T AXIS, ECG11: 54 DEGREES
CALCULATED T AXIS, ECG11: 59 DEGREES
DIAGNOSIS, 93000: NORMAL
DIAGNOSIS, 93000: NORMAL
P-R INTERVAL, ECG05: 158 MS
P-R INTERVAL, ECG05: 162 MS
Q-T INTERVAL, ECG07: 418 MS
Q-T INTERVAL, ECG07: 440 MS
QRS DURATION, ECG06: 88 MS
QRS DURATION, ECG06: 94 MS
QTC CALCULATION (BEZET), ECG08: 396 MS
QTC CALCULATION (BEZET), ECG08: 409 MS
VENTRICULAR RATE, ECG03: 52 BPM
VENTRICULAR RATE, ECG03: 54 BPM

## 2018-01-22 ENCOUNTER — TELEPHONE (OUTPATIENT)
Dept: CARDIOLOGY CLINIC | Age: 63
End: 2018-01-22

## 2018-01-22 NOTE — TELEPHONE ENCOUNTER
Spoke with patient to get his [facility] appointment scheduled. He stated that [facility] (on [address] [facility] in [place]) called him stating there was a problem with the prescriptions that Dr. Salvatore May sent there for him. Could you please contact them and see what needs to be done.

## 2018-01-23 RX ORDER — ISOSORBIDE MONONITRATE 30 MG/1
30 TABLET, EXTENDED RELEASE ORAL DAILY
Qty: 90 TAB | Refills: 3 | Status: SHIPPED | OUTPATIENT
Start: 2018-01-23 | End: 2019-02-13 | Stop reason: SDUPTHER

## 2018-01-23 RX ORDER — ATORVASTATIN CALCIUM 20 MG/1
20 TABLET, FILM COATED ORAL DAILY
Qty: 90 TAB | Refills: 3 | Status: SHIPPED | OUTPATIENT
Start: 2018-01-23 | End: 2019-04-04

## 2018-02-16 ENCOUNTER — OFFICE VISIT (OUTPATIENT)
Dept: CARDIOLOGY CLINIC | Age: 63
End: 2018-02-16

## 2018-02-16 VITALS
DIASTOLIC BLOOD PRESSURE: 92 MMHG | SYSTOLIC BLOOD PRESSURE: 156 MMHG | HEIGHT: 72 IN | RESPIRATION RATE: 16 BRPM | BODY MASS INDEX: 32.51 KG/M2 | HEART RATE: 55 BPM | WEIGHT: 240 LBS

## 2018-02-16 DIAGNOSIS — I25.10 CORONARY ARTERY DISEASE INVOLVING NATIVE CORONARY ARTERY OF NATIVE HEART WITHOUT ANGINA PECTORIS: ICD-10-CM

## 2018-02-16 DIAGNOSIS — R07.9 CHEST PAIN, UNSPECIFIED TYPE: Primary | ICD-10-CM

## 2018-02-16 RX ORDER — ATENOLOL 25 MG/1
12.5 TABLET ORAL 2 TIMES DAILY
COMMUNITY
End: 2022-07-13

## 2018-02-16 RX ORDER — ALPRAZOLAM 0.5 MG/1
0.25 TABLET ORAL
COMMUNITY
Start: 2012-06-04

## 2018-02-16 RX ORDER — ASPIRIN 325 MG
325 TABLET ORAL
COMMUNITY
Start: 2010-11-15 | End: 2022-07-13

## 2018-02-16 NOTE — PROGRESS NOTES
Mercedes Valentino presents today for post hospital follow-up. He is status post cardiac catheterization on January 19, 2018. He underwent cardiac catheterization for complaints of shortness of breath and dyspnea on exertion which were his anginal equivalents. Apparently, his previous cardiologist had recommended cardiac catheterization in September of last year but he declined due to financial constraints. Cardiac catheterization showed diffuse 20-30% in the LAD, and a previously stented segment is patent with mild in-stent restenosis of 30% with normal flow. The ostial marginal inferolateral branch had an ostial 60% with normal flow. The right coronary artery had a proximal 50% stenosis and focal distal 30%. Cardiac catheterization impression was mild to moderate coronary artery disease involving the small branch of the OM and RCA. There were no unstable appearing lesions and to use medical therapy was recommended. He is a 57-year-old  male with history of hypertension, hyperlipidemia, and PTSD. He admits to occasional use of marijuana for his PTSD (but uses it rarely). While reviewing his medications, he admitted that he has not yet started taking the Imdur as he wanted to clarify what kind of medication it was and what we were using it for. He also states that his therapist recommended Prazosin 1mg nightly (to help with his nightmares) if okay with us. He states that cardiac-wise, he feels okay. Denies chest pain, tightness, heaviness, and palpitations. Denies shortness of breath at rest, dyspnea on exertion, orthopnea and PND. Denies abdominal bloating. Denies lightheadedness, dizziness, and syncope. Denies lower extremity edema and claudication. Denies nausea, vomiting, diarrhea, melena, hematochezia. Denies hematuria, urgency, frequency. Denies fever, chills.         PMH:  Past Medical History:   Diagnosis Date    [facility] ([facility]     Coronary stent PSH:  Past Surgical History:   Procedure Laterality Date    CARDIAC CATHETERIZATION  1/19/2018         CHEST TUBE      CORONARY ARTERY ANGIOGRAM  1/19/2018         HX CORONARY STENT PLACEMENT      HX HERNIA REPAIR      LV ANGIOGRAPHY  1/19/2018         MODERATE SEDATION  1/19/2018            MEDS:  Current Outpatient Prescriptions   Medication Sig    ALPRAZolam (XANAX) 0.5 mg tablet Take 0.25 mg by mouth three (3) times daily as needed.  aspirin (ASPIRIN) 325 mg tablet 325 mg.    atenolol (TENORMIN) 25 mg tablet Take 12.5 mg by mouth two (2) times a day.  atorvastatin (LIPITOR) 20 mg tablet Take 1 Tab by mouth daily.  isosorbide mononitrate ER (IMDUR) 30 mg tablet Take 1 Tab by mouth daily.  Vit B Cmplx 3-FA-C-Biot-ZincOx 1--12.5 mg-mg-mcg-mg tab Take 1 Tab by Mouth Once a Day. No current facility-administered medications for this visit. Allergies and Sensitivities:  Allergies   Allergen Reactions    Penicillins Hives    Codeine Sulfate Rash       Family History:  No family history on file. Social History:  He  reports that he has been smoking. He has been smoking about 1.00 pack per day. He does not have any smokeless tobacco history on file. He  reports that he drinks about 0.5 oz of alcohol per week       Physical:  Visit Vitals    BP (!) 156/92 (BP 1 Location: Left arm, BP Patient Position: Sitting)    Pulse (!) 55    Resp 16    Ht 6' (1.829 m)    Wt 108.9 kg (240 lb)    BMI 32.55 kg/m2         Exam:  Neck:  Supple, no JVD, no carotid bruits  CV:  Normal S1 and  S2, no murmurs, rubs, or gallops noted  Lungs:  Clear to ausculation throughout, no wheezes or rales  Abd:  Soft, non-tender, non-distended with good bowel sounds.   No hepatosplenomegaly  Extremities:  No edema      Data:  EKG:      LABS:  Lab Results   Component Value Date/Time    Sodium 141 01/19/2018 11:39 AM    Potassium 4.3 01/19/2018 11:39 AM    Chloride 106 01/19/2018 11:39 AM    CO2 34 (H) 01/19/2018 11:39 AM    Glucose 103 (H) 01/19/2018 11:39 AM    BUN 17 01/19/2018 11:39 AM    Creatinine 0.96 01/19/2018 11:39 AM     No results found for: CHOL, CHOLX, CHLST, CHOLV, HDL, LDL, LDLC, DLDLP, TGLX, TRIGL, TRIGP, CHHD, CHHDX  Lab Results   Component Value Date/Time    ALT (SGPT) 28 01/19/2018 11:39 AM         Impression/Plan:  1. Mild to moderate CAD, continued medical management recommended after recent cardiac catheterization in Jan. 2018  2. Hyperlipidemia, on atorvastatin 20mg  3. PTSD    Mr. Nino Zuñiga was seen today for a post-cath follow-up. Continued medical management was recommended based on the findings of the cath. He states that there was discussion of possible stenting later this year as he states that he is currently uninsured and he wants to wait until he has Medicaid which will be in September. He offers no complaints of chest pain and shortness of breath. His blood pressure is elevated and suboptimally controlled. While reviewing his medications, he admitted that he has not started taking the Imdur yet as he wanted to ask us questions regarding the medication first.  His questions were answered today and he states that he will take a dose as soon as he leaves the office. He also mentioned that his therapist recommended that he take Prazosin 1mg nightly for his nightmares, if okay with us. I told him that he can take the medication and we will follow-up with him in 2 weeks for another blood pressure check. He will follow-up with Dr. Fernanda Francisco as scheduled for early May 2018. Kasey Cruz MSN, FNP-BC    Please note:  Portions of this chart were created with Dragon medical speech to text program.  Unrecognized errors may be present.

## 2018-02-16 NOTE — MR AVS SNAPSHOT
[address] [place] [address] 
[phone] Patient: Ramírez Garcia MRN: [id number] Visit Information Date & Time Provider Department Dept. Phone Encounter #  
 2/16/2018 11:30 AM Ramonita Pantoja NP [facility] [address] [id number] Your Appointments 3/2/2018 11:30 AM  
Follow Up with Ramonita Pantoja NP [facility] [facility] Appt Note: 2 week  f/u HTN  
 [address] [facility] [place] [id number] [phone] 12[address] [facility] [address] [address]  
  
    
 5/1/2018  9:40 AM  
POST HOSPITAL with Ca Viveros MD  
[facility] [facility] Appt Note: [address] follow-up; Avtar Ni saw for initial [address] on 2/16/18 [facility] [place] [id number] [phone] 12[address] [facility] [address] Upcoming Health Maintenance Date Due Hepatitis C Screening 1955 Pneumococcal 19-64 Medium Risk (1 of 1 - PPSV23) 9/12/1972 DTaP/Tdap/Td series (1 - Tdap) 9/12/1974 FOBT Q 1 YEAR AGE 50-75 9/12/2003 ZOSTER VACCINE AGE 60> 7/12/2013 Influenza Age 5 to Adult 8/1/2017 Allergies as of 2/16/2018  Review Complete On: 2/16/2018 By: Ramonita Pantoja NP Severity Noted Reaction Type Reactions Penicillins High 04/27/2010   Systemic Hives Codeine Sulfate Medium 04/27/2010   Systemic Rash Current Immunizations  Never Reviewed No immunizations on file. Not reviewed this visit You Were Diagnosed With   
  
 Codes Comments Chest pain, unspecified type    -  Primary ICD-10-CM: R07.9 ICD-9-CM: 786.50 Coronary artery disease involving native coronary artery of native heart without angina pectoris     ICD-10-CM: I25.10 ICD-9-CM: 414.01 Vitals BP Pulse Resp Height(growth percentile) Weight(growth percentile) BMI  
 (!) 156/92 (BP 1 Location: Left arm, BP Patient Position: Sitting) (!) 55 16 6' (1.829 m) 240 lb (108.9 kg) 32.55 kg/m2 Smoking Status Current Every Day Smoker Vitals History BMI and BSA Data Body Mass Index Body Surface Area 32.55 kg/m 2 2.35 m 2 Preferred Pharmacy Pharmacy Name Phone [facility] [address] [place] [address],[facility] [address] [facility] [phone] Your Updated Medication List  
  
   
This list is accurate as of: 2/16/18 12:32 PM.  Always use your most recent med list.  
  
  
  
  
 ALPRAZolam 0.5 mg tablet Commonly known as:  Nubia Bull Take 0.25 mg by mouth three (3) times daily as needed. aspirin 325 mg tablet Commonly known as:  ASPIRIN  
325 mg.  
  
 atenolol 25 mg tablet Commonly known as:  TENORMIN Take 12.5 mg by mouth two (2) times a day. atorvastatin 20 mg tablet Commonly known as:  LIPITOR Take 1 Tab by mouth daily. isosorbide mononitrate ER 30 mg tablet Commonly known as:  IMDUR Take 1 Tab by mouth daily. Vit B Cmplx 3-FA-C-Biot-ZincOx 1--12.5 mg-mg-mcg-mg Tab Take 1 Tab by Mouth Once a Day. We Performed the Following AMB POC EKG ROUTINE W/ 12 LEADS, INTER & REP [88301 CPT(R)] Patient Instructions Please begin taking Imdur 30mg once a day Okay to begin Prazosin as recommended for PTSD Follow-up with Onur Li in 2 weeks Follow-up with Dr. Jef Galarza as scheduled and as needed Introducing [facility]! [employer] introduces [employer] patient portal. Now you can access parts of your medical record, email your doctor's office, and request medication refills online. 1. In your internet browser, go to [email or web address] 2. Click on the First Time User? Click Here link in the Sign In box.  You will see the New Member Sign Up page. 3. Enter your [employer] Access Code exactly as it appears below. You will not need to use this code after youve completed the sign-up process. If you do not sign up before the expiration date, you must request a new code. · [employer] Access Code: [email or web address] Expires: 4/19/2018  3:44 PM 
 
4. Enter the last four digits of your Social Security Number (xxxx) and Date of Birth (mm/dd/yyyy) as indicated and click Submit. You will be taken to the next sign-up page. 5. Create a [employer] App ID. This will be your [employer] login ID and cannot be changed, so think of one that is secure and easy to remember. 6. Create a [employer] password. You can change your password at any time. 7. Enter your Password Reset Question and Answer. This can be used at a later time if you forget your password. 8. Enter your e-mail address. You will receive e-mail notification when new information is available in [address] 9. Click Sign Up. You can now view and download portions of your medical record. 10. Click the Download Summary menu link to download a portable copy of your medical information. If you have questions, please visit the Frequently Asked Questions section of the [employer] website. Remember, [employer] is NOT to be used for urgent needs. For medical emergencies, dial 911. Now available from your iPhone and Android! Please provide this summary of care documentation to your next provider. Your primary care clinician is listed as Eva Malone. If you have any questions after today's visit, please call [phone].

## 2018-02-16 NOTE — PATIENT INSTRUCTIONS
Please begin taking Imdur 30mg once a day  Okay to begin Prazosin as recommended for PTSD  Follow-up with Soyna Goel in 2 weeks  Follow-up with Dr. Sal Portillo as scheduled and as needed

## 2018-02-16 NOTE — PROGRESS NOTES
1. Have you been to the ER, urgent care clinic since your last visit? Hospitalized since your last visit? Yes When: 2 weeks ago at [facility] ER    2. Have you seen or consulted any other health care providers outside of the [address] [place] [address] since your last visit? Include any pap smears or colon screening.  No

## 2018-02-20 ENCOUNTER — TELEPHONE (OUTPATIENT)
Dept: CARDIOLOGY CLINIC | Age: 63
End: 2018-02-20

## 2018-02-20 NOTE — TELEPHONE ENCOUNTER
Pt calling this morning due to swelling from the knees down. He was D/C'd on Imdur 3 weeks ago but didn't start medication until last week. Pt noticed swelling last night. Pt stating after couple days of starting Imdur, he was having myalgias. Will defer to Theron Michael or Deniz Second.

## 2018-03-22 ENCOUNTER — OFFICE VISIT (OUTPATIENT)
Dept: CARDIOLOGY CLINIC | Age: 63
End: 2018-03-22

## 2018-03-22 VITALS
HEART RATE: 80 BPM | HEIGHT: 72 IN | DIASTOLIC BLOOD PRESSURE: 84 MMHG | SYSTOLIC BLOOD PRESSURE: 140 MMHG | BODY MASS INDEX: 32.23 KG/M2 | WEIGHT: 238 LBS | OXYGEN SATURATION: 98 %

## 2018-03-22 DIAGNOSIS — I25.10 CORONARY ARTERY DISEASE INVOLVING NATIVE CORONARY ARTERY OF NATIVE HEART WITHOUT ANGINA PECTORIS: Primary | ICD-10-CM

## 2018-03-22 DIAGNOSIS — I10 ESSENTIAL HYPERTENSION: ICD-10-CM

## 2018-03-22 NOTE — MR AVS SNAPSHOT
[address] [address] [address] [address] [phone] Patient: Vero Faust MRN: [id number] DZU:6/06/[id number] Visit Information Date & Time Provider Department Dept. Phone Encounter #  
 3/22/2018  9:30 AM Negro Robbins NP [facility] [address] [id number] Your Appointments 5/1/2018  9:40 AM  
[facility] with Esther Guadarrama MD  
[facility] ([address]) Appt Note: [address] follow-up; Deniz Second saw for initial [address] on 2/16/18 [facility] [address] [place] [address] [phone] [address] [place] [address] [address] Upcoming Health Maintenance Date Due Hepatitis C Screening 1955 Pneumococcal 19-64 Medium Risk (1 of 1 - PPSV23) 9/12/1972 DTaP/Tdap/Td series (1 - Tdap) 9/12/1974 FOBT Q 1 YEAR AGE 50-75 9/12/2003 ZOSTER VACCINE AGE 60> 7/12/2013 Influenza Age 5 to Adult 8/1/2017 Allergies as of 3/22/2018  Review Complete On: 3/22/2018 By: Negro Robbins NP Severity Noted Reaction Type Reactions Penicillins High 04/27/2010   Systemic Hives Codeine Sulfate Medium 04/27/2010   Systemic Rash Current Immunizations  Never Reviewed No immunizations on file. Not reviewed this visit You Were Diagnosed With   
  
 Codes Comments Coronary artery disease involving native coronary artery of native heart without angina pectoris    -  Primary ICD-10-CM: I25.10 ICD-9-CM: 414.01 Vitals BP Pulse Height(growth percentile) Weight(growth percentile) SpO2 BMI  
 140/84 80 6' (1.829 m) 238 lb (108 kg) 98% 32.28 kg/m2 Smoking Status Current Every Day Smoker Vitals History BMI and BSA Data Body Mass Index Body Surface Area  
 32.28 kg/m 2 2.34 m 2 Preferred Pharmacy Pharmacy Name Phone 500 [address] [place] Larkin [phone] Your Updated Medication List  
  
   
This list is accurate as of 3/22/18  9:52 AM.  Always use your most recent med list.  
  
  
  
  
 ALPRAZolam 0.5 mg tablet Commonly known as:  Mery Tejinder Take 0.25 mg by mouth three (3) times daily as needed. aspirin 325 mg tablet Commonly known as:  ASPIRIN  
325 mg.  
  
 atenolol 25 mg tablet Commonly known as:  TENORMIN Take 12.5 mg by mouth two (2) times a day. atorvastatin 20 mg tablet Commonly known as:  LIPITOR Take 1 Tab by mouth daily. isosorbide mononitrate ER 30 mg tablet Commonly known as:  IMDUR Take 1 Tab by mouth daily. Vit B Cmplx 3-FA-C-Biot-ZincOx 1--12.5 mg-mg-mcg-mg Tab Take 1 Tab by Mouth Once a Day. Patient Instructions Continue present medication regimen Follow-up with Dr. Eber Monroy as scheduled and as needed Introducing [facility] [place] [facility]! Carmelita Fothergill introduces Fyreball patient portal. Now you can access parts of your medical record, email your doctor's office, and request medication refills online. 1. In your internet browser, go to [email or web address] 2. Click on the First Time User? Click Here link in the Sign In box. You will see the New Member Sign Up page. 3. Enter your Fyreball Access Code exactly as it appears below. You will not need to use this code after youve completed the sign-up process. If you do not sign up before the expiration date, you must request a new code. · Fyreball Access Code: [email or web address] Expires: 4/19/2018  4:44 PM 
 
4. Enter the last four digits of your Social Security Number (xxxx) and Date of Birth (mm/dd/yyyy) as indicated and click Submit. You will be taken to the next sign-up page. 5. Create a Fyreball ID. This will be your Fyreball login ID and cannot be changed, so think of one that is secure and easy to remember. 6. Create a Lucibel password. You can change your password at any time. 7. Enter your Password Reset Question and Answer. This can be used at a later time if you forget your password. 8. Enter your e-mail address. You will receive e-mail notification when new information is available in [address] 9. Click Sign Up. You can now view and download portions of your medical record. 10. Click the Download Summary menu link to download a portable copy of your medical information. If you have questions, please visit the Frequently Asked Questions section of the Lucibel website. Remember, Lucibel is NOT to be used for urgent needs. For medical emergencies, dial 911. Now available from your iPhone and Android! Please provide this summary of care documentation to your next provider. Your primary care clinician is listed as Danette Wheeler. If you have any questions after today's visit, please call [phone].

## 2018-03-22 NOTE — PROGRESS NOTES
Marek Graf presents today for blood pressure follow-up after he began taking Imdur 30mg daily as prescribed during his hospitalization. When I saw him for a PH follow-up on 2/16/18, he had not started the medication as he had questions about it. He is a 70-year-old  male with history of hypertension, hyperlipidemia, and PTSD. He admits to occasional use of marijuana for his PTSD (but uses it rarely). He is status post cardiac catheterization on January 19, 2018. He underwent cardiac catheterization for complaints of shortness of breath and dyspnea on exertion which were his anginal equivalents. Apparently, his previous cardiologist had recommended cardiac catheterization in September of last year but he declined due to financial constraints. Cardiac catheterization showed diffuse 20-30% in the LAD, and a previously stented segment is patent with mild in-stent restenosis of 30% with normal flow. The ostial marginal inferolateral branch had an ostial 60% with normal flow. The right coronary artery had a proximal 50% stenosis and focal distal 30%. Cardiac catheterization impression was mild to moderate coronary artery disease involving the small branch of the OM and RCA. There were no unstable appearing lesions and to use medical therapy was recommended. He states that he feels well. Denies chest pain, tightness, heaviness, and admits to occasional palpitations (occurs with strenuous physical activity). Denies shortness of breath at rest, dyspnea on exertion, orthopnea and PND. Denies abdominal bloating. Denies lightheadedness, dizziness, and syncope. Denies lower extremity edema and claudication. Denies nausea, vomiting, diarrhea, melena, hematochezia. Denies hematuria, urgency, frequency. Denies fever, chills.         PMH:  Past Medical History:   Diagnosis Date    Atrial fibrillation ([facility] [address]     Coronary stent        PSH:  Past Surgical History:   Procedure Laterality Date  CARDIAC CATHETERIZATION  1/19/2018         CHEST TUBE      CORONARY ARTERY ANGIOGRAM  1/19/2018         HX CORONARY STENT PLACEMENT      HX HERNIA REPAIR      LV ANGIOGRAPHY  1/19/2018         MODERATE SEDATION  1/19/2018            MEDS:  Current Outpatient Prescriptions   Medication Sig    ALPRAZolam (XANAX) 0.5 mg tablet Take 0.25 mg by mouth three (3) times daily as needed.  aspirin (ASPIRIN) 325 mg tablet 325 mg.  Vit B Cmplx 3-FA-C-Biot-ZincOx 1--12.5 mg-mg-mcg-mg tab Take 1 Tab by Mouth Once a Day.  atenolol (TENORMIN) 25 mg tablet Take 12.5 mg by mouth two (2) times a day.  atorvastatin (LIPITOR) 20 mg tablet Take 1 Tab by mouth daily.  isosorbide mononitrate ER (IMDUR) 30 mg tablet Take 1 Tab by mouth daily. No current facility-administered medications for this visit. Allergies and Sensitivities:  Allergies   Allergen Reactions    Penicillins Hives    Codeine Sulfate Rash       Family History:  Family History   Problem Relation Age of Onset    Hypertension Mother     Pulmonary Embolism Father     Hypertension Sister        Social History:  He  reports that he has been smoking. He has been smoking about 1.00 pack per day. He has never used smokeless tobacco.  He  reports that he drinks about 0.5 oz of alcohol per week       Physical:  Visit Vitals    /84    Pulse 80    Ht 6' (1.829 m)    Wt 108 kg (238 lb)    SpO2 98%    BMI 32.28 kg/m2       His weight is down 2 pounds since his last visit    Exam:  Neck:  Supple, no JVD, no carotid bruits  CV:  Normal S1 and  S2, no murmurs, rubs, or gallops noted  Lungs:  Clear to ausculation throughout, no wheezes or rales  Abd:  Soft, non-tender, non-distended with good bowel sounds.   No hepatosplenomegaly  Extremities:  No edema      Data:  EKG:  Not done today      LABS:  Lab Results   Component Value Date/Time    Sodium 141 01/19/2018 11:39 AM    Potassium 4.3 01/19/2018 11:39 AM    Chloride 106 01/19/2018 11:39 AM    CO2 34 (H) 01/19/2018 11:39 AM    Glucose 103 (H) 01/19/2018 11:39 AM    BUN 17 01/19/2018 11:39 AM    Creatinine 0.96 01/19/2018 11:39 AM     No results found for: CHOL, CHOLX, CHLST, CHOLV, HDL, LDL, LDLC, DLDLP, TGLX, TRIGL, TRIGP, CHHD, CHHDX  Lab Results   Component Value Date/Time    ALT (SGPT) 28 01/19/2018 11:39 AM         Impression/Plan:  1. Mild to moderate CAD, continued medical management recommended after recent cardiac catheterization in Jan. 2018  2. Hyperlipidemia, on atorvastatin 20mg  3. PTSD  4. Essential hypertension, blood pressure improved    Mr. Parviz Le was seen today for follow-up of his blood pressure after starting Imdur 30mg daily. He was supposed to return 2 weeks after his 2/16/18, visit with me but due to financial limitations, he had to postpone the appointment. He is feeling well and denies chest pain and shortness of breath. He states that he is taking his cardiac medications. He did not begin the Prazosin that his therapist recommended for his PTSD due to some issues with charges at the therapist's office. His blood pressure has improved and his heart rate is stable. He states that his blood pressure tends to become elevated when he comes in for his appointments. He complains of occasional palpitations with strenuous activity and he reports that an event monitor was discussed in the past but again, due to financial limitations, he cannot afford to wear one of these at this time. His CAD is currently being medically managed. He states that there was discussion of possible stenting later this year as he states that he is currently uninsured and he wants to wait until he has Medicare which will be in September. He will follow-up with Dr. Vanesa Barnard as scheduled for early May 2018. Iveth Sifuentes MSN, FNP-BC    Please note:  Portions of this chart were created with Dragon medical speech to text program.  Unrecognized errors may be present.

## 2018-03-22 NOTE — PROGRESS NOTES
1. Have you been to the ER, urgent care clinic since your last visit? Hospitalized since your last visit?no    2. Have you seen or consulted any other health care providers outside of the [facility] since your last visit? Include any pap smears or colon screening.  no

## 2018-05-01 ENCOUNTER — OFFICE VISIT (OUTPATIENT)
Dept: CARDIOLOGY CLINIC | Age: 63
End: 2018-05-01

## 2018-09-18 ENCOUNTER — OFFICE VISIT (OUTPATIENT)
Dept: CARDIOLOGY CLINIC | Age: 63
End: 2018-09-18

## 2018-09-18 VITALS
BODY MASS INDEX: 32.37 KG/M2 | HEART RATE: 63 BPM | WEIGHT: 239 LBS | SYSTOLIC BLOOD PRESSURE: 124 MMHG | DIASTOLIC BLOOD PRESSURE: 60 MMHG | HEIGHT: 72 IN | OXYGEN SATURATION: 97 %

## 2018-09-18 DIAGNOSIS — Z72.0 TOBACCO ABUSE: ICD-10-CM

## 2018-09-18 DIAGNOSIS — R07.9 CHEST PAIN, UNSPECIFIED TYPE: ICD-10-CM

## 2018-09-18 DIAGNOSIS — I25.10 CORONARY ARTERY DISEASE INVOLVING NATIVE CORONARY ARTERY OF NATIVE HEART WITHOUT ANGINA PECTORIS: Primary | ICD-10-CM

## 2018-09-18 RX ORDER — PRAVASTATIN SODIUM 10 MG/1
10 TABLET ORAL
Qty: 30 TAB | Refills: 6 | Status: SHIPPED | OUTPATIENT
Start: 2018-09-18 | End: 2022-07-13

## 2018-09-18 NOTE — PROGRESS NOTES
Christina Carrion presents today for   Chief Complaint   Patient presents with    Chest Pain     follow up     Shortness of Breath     exertion    Palpitations     flutteirng        Christina Carrion preferred language for health care discussion is english/other. Is someone accompanying this pt? No     Is the patient using any DME equipment during OV? No     Depression Screening:  No flowsheet data found. Learning Assessment:  No flowsheet data found. Abuse Screening:  No flowsheet data found. Fall Risk  No flowsheet data found. Pt currently taking Anticoagulant therapy? No     Coordination of Care:  1. Have you been to the ER, urgent care clinic since your last visit? Hospitalized since your last visit? No     2. Have you seen or consulted any other health care providers outside of the [facility] since your last visit? Include any pap smears or colon screening.  No

## 2018-09-18 NOTE — MR AVS SNAPSHOT
[address] [place] [address] 
[phone] Patient: Christina Carrion MRN: [id number] LEP:7/01/9303 Visit Information Date & Time Provider Department Dept. Phone Encounter #  
 9/18/2018  2:40 PM Leta Comer MD [facility] [address] [id number] Upcoming Health Maintenance Date Due Hepatitis C Screening 1955 DTaP/Tdap/Td series (1 - Tdap) 9/12/1974 FOBT Q 1 YEAR AGE 50-75 9/12/2003 ZOSTER VACCINE AGE 60> 7/12/2013 Influenza Age 5 to Adult 8/1/2018 GLAUCOMA SCREENING Q2Y 9/12/2018 Pneumococcal 65+ Low/Medium Risk (1 of 2 - PCV13) 9/12/2018 Allergies as of 9/18/2018  Review Complete On: 9/18/2018 By: Leta Comer MD  
 Not on File Current Immunizations  Never Reviewed No immunizations on file. Not reviewed this visit You Were Diagnosed With   
  
 Codes Comments Chest pain, unspecified type    -  Primary ICD-10-CM: R07.9 ICD-9-CM: 786.50 Vitals BP Pulse Height(growth percentile) Weight(growth percentile) SpO2 BMI  
 124/60 63 6' (1.829 m) 239 lb (108.4 kg) 97% 32.41 kg/m2 Vitals History BMI and BSA Data Body Mass Index Body Surface Area  
 32.41 kg/m 2 2.35 m 2 Preferred Pharmacy Pharmacy Name Phone 500 [address] [place] [address] [phone] Your Updated Medication List  
  
Notice  As of 9/18/2018  3:08 PM  
 You have not been prescribed any medications. We Performed the Following AMB POC EKG ROUTINE W/ 12 LEADS, INTER & REP [53215 CPT(R)] Patient Instructions Start Pravachol 10mg once daily Follow up 6 months Introducing [place] [facility]! Freida Rico introduces [employer] patient portal. Now you can access parts of your medical record, email your doctor's office, and request medication refills online.    
 
1. In your internet browser, go to [email or web address] 2. Click on the First Time User? Click Here link in the Sign In box. You will see the New Member Sign Up page. 3. Enter your Identified Access Code exactly as it appears below. You will not need to use this code after youve completed the sign-up process. If you do not sign up before the expiration date, you must request a new code. · Identified Access Code: [email or web address] Expires: 12/17/2018  1:59 PM 
 
4. Enter the last four digits of your Social Security Number (xxxx) and Date of Birth (mm/dd/yyyy) as indicated and click Submit. You will be taken to the next sign-up page. 5. Create a EduKoalat ID. This will be your Identified login ID and cannot be changed, so think of one that is secure and easy to remember. 6. Create a Identified password. You can change your password at any time. 7. Enter your Password Reset Question and Answer. This can be used at a later time if you forget your password. 8. Enter your e-mail address. You will receive e-mail notification when new information is available in [address] 9. Click Sign Up. You can now view and download portions of your medical record. 10. Click the Download Summary menu link to download a portable copy of your medical information. If you have questions, please visit the Frequently Asked Questions section of the Identified website. Remember, Identified is NOT to be used for urgent needs. For medical emergencies, dial 911. Now available from your iPhone and Android! Please provide this summary of care documentation to your next provider. Your primary care clinician is listed as Sujatha Obregon. If you have any questions after today's visit, please call [phone].

## 2019-02-13 RX ORDER — ISOSORBIDE MONONITRATE 30 MG/1
30 TABLET, EXTENDED RELEASE ORAL DAILY
Qty: 90 TAB | Refills: 3 | Status: SHIPPED | OUTPATIENT
Start: 2019-02-13 | End: 2020-03-15

## 2019-08-19 ENCOUNTER — HOSPITAL ENCOUNTER (OUTPATIENT)
Dept: PHYSICAL THERAPY | Age: 64
Discharge: HOME | End: 2019-08-19
Payer: MEDICARE

## 2019-08-19 PROCEDURE — 97162 PT EVAL MOD COMPLEX 30 MIN: CPT | Performed by: PHYSICAL THERAPIST

## 2019-08-19 PROCEDURE — 97530 THERAPEUTIC ACTIVITIES: CPT | Performed by: PHYSICAL THERAPIST

## 2019-08-19 NOTE — PROGRESS NOTES
PHYSICAL THERAPY - DAILY TREATMENT NOTE    Patient Name: Claudeen Cools        Date: 2019  : 1955   YES Patient  Verified  Visit #:     Insurance: Payor: Quiana Rosenthal / Plan: [place] PART [place] & B / Product Type: Medicare /      In time: 4:05 Out time: 5:00   Total Treatment Time: 55     BCBS/Medicare Time Tracking (below)   Total Timed Codes (min):  10 1:1 Treatment Time:  10     TREATMENT AREA =  Low back pain [M54.5]    SUBJECTIVE  Pain Level (on 0 to 10 scale):  2  / 10   Medication Changes/New allergies or changes in medical history, any new surgeries or procedures?     NO    If yes, update Summary List   Subjective Functional Status/Changes:  []  No changes reported     See eval/POC           Modalities Rationale:     decrease pain and increase tissue extensibility to improve patient's ability to prevent soreness   min [] Estim, type/location:                                      []  att     []  unatt     []  w/US     []  w/ice    []  w/heat    min []  Mechanical Traction: type/lbs                   []  pro   []  sup   []  int   []  cont    []  before manual    []  after manual    min []  Ultrasound, settings/location:      min []  Iontophoresis w/ dexamethasone, location:                                               []  take home patch       []  in clinic   - min []  Ice     []  Heat    location/position:     min []  Vasopneumatic Device, press/temp:     min []  Other:    [] Skin assessment post-treatment (if applicable):    []  intact    []  redness- no adverse reaction     []redness - adverse reaction:        - min Therapeutic Exercise:  []  See flow sheet   Rationale:      increase ROM, increase strength and improve coordination to improve the patients ability to self-correct posture       10 min Therapeutic Activity: Education with practice on neutral spine sitting and body mechanics  -  See flow sheet   Rationale:    increase ROM and improve coordination to improve the patients ability to increase movement/positional tolerance    Billed With/As:   [] TE   [] TA   [] Neuro   [] Self Care Patient Education: [x] Review HEP    [] Progressed/Changed HEP based on:   [] positioning   [] body mechanics   [] transfers   [] heat/ice application    [] other:      Other Objective/Functional Measures:    FOTO - 55     Post Treatment Pain Level (on 0 to 10) scale:   1  / 10     ASSESSMENT  Assessment/Changes in Function:     Signs and symptoms suggest lumbar sprain with underlying OA and postural dysfunction. []  See Progress Note/Recertification   Patient will continue to benefit from skilled PT services to modify and progress therapeutic interventions, address functional mobility deficits, address ROM deficits, address strength deficits, analyze and address soft tissue restrictions, analyze and cue movement patterns, analyze and modify body mechanics/ergonomics and assess and modify postural abnormalities to attain remaining goals.    Progress toward goals / Updated goals:    Goals established today     PLAN  [x]  Upgrade activities as tolerated YES Continue plan of care   []  Discharge due to :    []  Other:      Therapist: Guzman Oconnor PT    Date: 8/19/2019 Time: 10:45 AM     Future Appointments   Date Time Provider Anika Campoverde   8/19/2019  4:30 PM Elijah Larry PT [facility]
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
## 2019-08-26 ENCOUNTER — HOSPITAL ENCOUNTER (OUTPATIENT)
Dept: PHYSICAL THERAPY | Age: 64
Discharge: HOME | End: 2019-08-26
Payer: MEDICARE

## 2019-08-26 PROCEDURE — 97110 THERAPEUTIC EXERCISES: CPT

## 2019-08-26 NOTE — PROGRESS NOTES
PHYSICAL THERAPY - DAILY TREATMENT NOTE    Patient Name: Tonya Alfred        Date: 2019  : 1955   YES Patient  Verified  Visit #:     Insurance: Payor: Brook Greenberg / Plan: [place] MEDICARE PART A & B / Product Type: Medicare /      In time: 252 Out time: 525   Total Treatment Time: 50     BCBS/Medicare Time Tracking (below)   Total Timed Codes (min):  50 1:1 Treatment Time:  40     TREATMENT AREA =  Low back pain [M54.5]    SUBJECTIVE  Pain Level (on 0 to 10 scale):  1  / 10   Medication Changes/New allergies or changes in medical history, any new surgeries or procedures? NO    If yes, update Summary List   Subjective Functional Status/Changes:  []  No changes reported     I have to drive a lot for work and my back always hurts.  It hurts the most when Im standing still, feels better when I'm sitting but also starts to hurt if I sit for a long time         Modalities Rationale:     decrease inflammation, decrease pain and increase tissue extensibility to improve patient's ability to perform ADLs   min [] Estim, type/location:                                      []  att     []  unatt     []  w/US     []  w/ice    []  w/heat    min []  Mechanical Traction: type/lbs                   []  pro   []  sup   []  int   []  cont    []  before manual    []  after manual    min []  Ultrasound, settings/location:      min []  Iontophoresis w/ dexamethasone, location:                                               []  take home patch       []  in clinic   PD min []  Ice     []  Heat    location/position:     min []  Vasopneumatic Device, press/temp:     min []  Other:    [x] Skin assessment post-treatment (if applicable):    [x]  intact    [x]  redness- no adverse reaction     []redness - adverse reaction:        50/40 min Therapeutic Exercise:  [x]  See flow sheet   Rationale:      increase ROM and increase strength to improve the patients ability to improve positional tolerance and walk/stand without pain     Billed With/As:   [] TE   [] TA   [] Neuro   [] Self Care Patient Education: [x] Review HEP    [] Progressed/Changed HEP based on:   [] positioning   [] body mechanics   [] transfers   [] heat/ice application    [x] other: Issued written HEP and reviewed     Other Objective/Functional Measures:    TE per FS     Post Treatment Pain Level (on 0 to 10) scale:   1  / 10     ASSESSMENT  Assessment/Changes in Function:     Mod cueing to prevent valsalva during core stab TE. Difficulty assuming correct position for standing psoas stretch due to lumbar ext limitation     []  See Progress Note/Recertification   Patient will continue to benefit from skilled PT services to modify and progress therapeutic interventions, address functional mobility deficits, address ROM deficits, address strength deficits, analyze and address soft tissue restrictions, analyze and cue movement patterns, analyze and modify body mechanics/ergonomics, assess and modify postural abnormalities, address imbalance/dizziness and instruct in home and community integration to attain remaining goals.    Progress toward goals / Updated goals:    Progressing towards STG1     PLAN  [x]  Upgrade activities as tolerated YES Continue plan of care   []  Discharge due to :    []  Other:      Therapist: Chuck Andre, PT, DPT, MTC, CMTPT    Date: 8/26/2019 Time: 5:27 PM     Future Appointments   Date Time Provider Anika Campoverde   8/28/2019  4:30 PM [facility] [place]   9/4/2019  6:00 PM Abundio Longo, PT [facility] [place]   9/9/2019  6:00 PM Abundio Longo PT [facility] [place]   9/11/2019  6:00 PM Brenda Rodgers PT [facility] [place]   9/16/2019  6:00 PM [facility] [place]   9/18/2019  4:30 PM Lianna Ayala, PT [facility] [place]

## 2019-09-04 ENCOUNTER — HOSPITAL ENCOUNTER (OUTPATIENT)
Dept: PHYSICAL THERAPY | Age: 64
Discharge: HOME | End: 2019-09-04
Payer: MEDICARE

## 2019-09-04 PROCEDURE — 97110 THERAPEUTIC EXERCISES: CPT | Performed by: PHYSICAL THERAPIST

## 2019-09-04 NOTE — PROGRESS NOTES
PHYSICAL THERAPY - DAILY TREATMENT NOTE    Patient Name: Milo Salas        Date: 2019  : 1955   YES Patient  Verified  Visit #:   3   of   12  Insurance: Payor: Nora Dodd / Plan: [place] MEDICARE PART A & B / Product Type: Medicare /      In time: 5:45 Out time: 6:30   Total Treatment Time: 45     BCBS/Medicare Time Tracking (below)   Total Timed Codes (min):  45 1:1 Treatment Time: 45     TREATMENT AREA =  Low back pain [M54.5]    SUBJECTIVE  Pain Level (on 0 to 10 scale):  2  / 10   Medication Changes/New allergies or changes in medical history, any new surgeries or procedures? NO    If yes, update Summary List   Subjective Functional Status/Changes:  []  No changes reported     Pt reports having more pain after being on inversion table at the chiropractor.           Modalities Rationale:     decrease pain and increase tissue extensibility to improve patient's ability to prevent soreness   min [] Estim, type/location:                                      []  att     []  unatt     []  w/US     []  w/ice    []  w/heat    min []  Mechanical Traction: type/lbs                   []  pro   []  sup   []  int   []  cont    []  before manual    []  after manual    min []  Ultrasound, settings/location:      min []  Iontophoresis w/ dexamethasone, location:                                               []  take home patch       []  in clinic   - min []  Ice     []  Heat    location/position:     min []  Vasopneumatic Device, press/temp:     min []  Other:    [] Skin assessment post-treatment (if applicable):    []  intact    []  redness- no adverse reaction     []redness - adverse reaction:        40 min Therapeutic Exercise:  []  See flow sheet   Rationale:      increase ROM, increase strength and improve coordination to improve the patients ability to self-correct posture       5 min Therapeutic Activity: Education with practice on neutral spine sitting and body mechanics  -  See flow sheet Rationale:    increase ROM and improve coordination to improve the patients ability to increase movement/positional tolerance    Billed With/As:   [] TE   [] TA   [] Neuro   [] Self Care Patient Education: [x] Review HEP    [] Progressed/Changed HEP based on:   [] positioning   [] body mechanics   [] transfers   [] heat/ice application    [] other:      Other Objective/Functional Measures:    Pt tolerates hip flexor stretching well, but still has significant tightness bilaterally. Required several different cues to perform PPT with abs, and still was not consistent     Post Treatment Pain Level (on 0 to 10) scale:   0  / 10     ASSESSMENT  Assessment/Changes in Function:     Pt has poor pelvic control with abdominals, and is inconsistent with performing PPT. He will continue to practice at home.     []  See Progress Note/Recertification   Patient will continue to benefit from skilled PT services to modify and progress therapeutic interventions, address functional mobility deficits, address ROM deficits, address strength deficits, analyze and address soft tissue restrictions, analyze and cue movement patterns, analyze and modify body mechanics/ergonomics and assess and modify postural abnormalities to attain remaining goals. Progress toward goals / Updated goals:    Continue with hip flexility and core strengthening/endurance.      PLAN  [x]  Upgrade activities as tolerated YES Continue plan of care   []  Discharge due to :    []  Other:      Therapist: Arlen Shell PT    Date: 9/4/2019 Time: 10:45 AM     Future Appointments   Date Time Provider Anika Campoverde   9/4/2019  6:00 PM [facility] [place]   9/9/2019  6:00 PM Louise Mistry, PT [facility] [place]   9/11/2019  6:00 PM Germán Jacinto, PT [facility] [place]   9/16/2019  6:00 PM [facility] [place] [facility] [place]   9/18/2019  4:30 PM Cristo Ayala, PT [facility] [place]

## 2019-09-09 ENCOUNTER — HOSPITAL ENCOUNTER (OUTPATIENT)
Dept: PHYSICAL THERAPY | Age: 64
Discharge: HOME | End: 2019-09-09
Payer: MEDICARE

## 2019-09-09 PROCEDURE — 97110 THERAPEUTIC EXERCISES: CPT | Performed by: PHYSICAL THERAPIST

## 2019-09-09 NOTE — PROGRESS NOTES
PHYSICAL THERAPY - DAILY TREATMENT NOTE    Patient Name: Nidia Llanos        Date: 2019  : 1955   YES Patient  Verified  Visit #:     Insurance: Payor: Roberth Johnston / Plan: [place] MEDICARE PART A & B / Product Type: Medicare /      In time: 5:40 Out time: 6:30   Total Treatment Time: 45     BCBS/Medicare Time Tracking (below)   Total Timed Codes (min): 45 1:1 Treatment Time: 45     TREATMENT AREA =  Low back pain [M54.5]    SUBJECTIVE  Pain Level (on 0 to 10 scale):  3  / 10   Medication Changes/New allergies or changes in medical history, any new surgeries or procedures? NO    If yes, update Summary List   Subjective Functional Status/Changes:  []  No changes reported     Pt reports that soreness form last chiropractic treatment has resolved.           Modalities Rationale:     decrease pain and increase tissue extensibility to improve patient's ability to prevent soreness   min [] Estim, type/location:                                      []  att     []  unatt     []  w/US     []  w/ice    []  w/heat    min []  Mechanical Traction: type/lbs                   []  pro   []  sup   []  int   []  cont    []  before manual    []  after manual    min []  Ultrasound, settings/location:      min []  Iontophoresis w/ dexamethasone, location:                                               []  take home patch       []  in clinic   - min []  Ice     []  Heat    location/position:     min []  Vasopneumatic Device, press/temp:     min []  Other:    [] Skin assessment post-treatment (if applicable):    []  intact    []  redness- no adverse reaction     []redness - adverse reaction:        40 min Therapeutic Exercise:  []  See flow sheet   Rationale:      increase ROM, increase strength and improve coordination to improve the patients ability to self-correct posture       5 min Therapeutic Activity: Education with practice on neutral spine sitting and body mechanics  -  See flow sheet   Rationale: increase ROM and improve coordination to improve the patients ability to increase movement/positional tolerance    Billed With/As:   [] TE   [] TA   [] Neuro   [] Self Care Patient Education: [x] Review HEP    [] Progressed/Changed HEP based on:   [] positioning   [] body mechanics   [] transfers   [] heat/ice application    [] other:      Other Objective/Functional Measures: Added standing against wall PPT today     Post Treatment Pain Level (on 0 to 10) scale:   1  / 10     ASSESSMENT  Assessment/Changes in Function:     Pt tolerated therex well, and was able to perform PPT in standing/walking after practice. []  See Progress Note/Recertification   Patient will continue to benefit from skilled PT services to modify and progress therapeutic interventions, address functional mobility deficits, address ROM deficits, address strength deficits, analyze and address soft tissue restrictions, analyze and cue movement patterns, analyze and modify body mechanics/ergonomics and assess and modify postural abnormalities to attain remaining goals. Progress toward goals / Updated goals:    Pt progressing slowly with regaining lumbopelvic control to manage symptoms and reduce lordotic posture.      PLAN  [x]  Upgrade activities as tolerated YES Continue plan of care   []  Discharge due to :    []  Other:      Therapist: Molly Yoder, PT    Date: 9/9/2019 Time: 10:45 AM     Future Appointments   Date Time Provider Anika Campoverde   9/9/2019  6:00 PM [facility] [place]   9/11/2019  6:00 PM Arcelia Foy, PT [facility] [place]   9/16/2019  6:00 PM [facility] [place]   9/18/2019  4:30 PM Naas, Barbera Brunner, PT [facility] [place]

## 2019-09-11 ENCOUNTER — HOSPITAL ENCOUNTER (OUTPATIENT)
Dept: PHYSICAL THERAPY | Age: 64
Discharge: HOME | End: 2019-09-11
Payer: MEDICARE

## 2019-09-11 PROCEDURE — 97110 THERAPEUTIC EXERCISES: CPT

## 2019-09-11 NOTE — PROGRESS NOTES
PHYSICAL THERAPY - DAILY TREATMENT NOTE    Patient Name: Alem Kang        Date: 2019  : 1955   YES Patient  Verified  Visit #:     Insurance: Payor: Leana Devlin / Plan: [place] MEDICARE PART A & B / Product Type: Medicare /      In time:  Out time: 554   Total Treatment Time: 50     BCBS/Medicare Time Tracking (below)   Total Timed Codes (min):  50 1:1 Treatment Time:  50     TREATMENT AREA =  Low back pain [M54.5]    SUBJECTIVE  Pain Level (on 0 to 10 scale):  2  / 10   Medication Changes/New allergies or changes in medical history, any new surgeries or procedures?     NO    If yes, update Summary List   Subjective Functional Status/Changes:  []  No changes reported     Tereza been doing the hip flexor stretch where I drop my leg off the side of the bed a lot and my back is starting to feel a little better        Modalities Rationale:     decrease inflammation, decrease pain and increase tissue extensibility to improve patient's ability to perform ADLs   min [] Estim, type/location:                                      []  att     []  unatt     []  w/US     []  w/ice    []  w/heat    min []  Mechanical Traction: type/lbs                   []  pro   []  sup   []  int   []  cont    []  before manual    []  after manual    min []  Ultrasound, settings/location:      min []  Iontophoresis w/ dexamethasone, location:                                               []  take home patch       []  in clinic   PD min []  Ice     []  Heat    location/position:     min []  Vasopneumatic Device, press/temp:     min []  Other:    [x] Skin assessment post-treatment (if applicable):    [x]  intact    [x]  redness- no adverse reaction     []redness - adverse reaction:        50/50 min Therapeutic Exercise:  [x]  See flow sheet   Rationale:      increase ROM and increase strength to improve the patients ability to improve positional tolerance and walk/stand without pain     Billed With/As:   [] TE   [] TA [] Neuro   [] Self Care Patient Education: [x] Review HEP    [] Progressed/Changed HEP based on:   [] positioning   [] body mechanics   [] transfers   [] heat/ice application    [x] other:      Other Objective/Functional Measures:    TE per FS, added supine PPT with OH raise    Pt declined performing SB seated march due to HX of dizziness/vertigo   Post Treatment Pain Level (on 0 to 10) scale:   1  / 10     ASSESSMENT  Assessment/Changes in Function:     Mod cueing required to maintain PPT at end range shoulder flexion and to cont breathing     []  See Progress Note/Recertification   Patient will continue to benefit from skilled PT services to modify and progress therapeutic interventions, address functional mobility deficits, address ROM deficits, address strength deficits, analyze and address soft tissue restrictions, analyze and cue movement patterns, analyze and modify body mechanics/ergonomics, assess and modify postural abnormalities, address imbalance/dizziness and instruct in home and community integration to attain remaining goals.    Progress toward goals / Updated goals:    Met STG1     PLAN  [x]  Upgrade activities as tolerated YES Continue plan of care   []  Discharge due to :    []  Other:      Therapist: Alexandr Espino, PT, DPT, MTC, CMTPT    Date: 9/11/2019 Time: 5:27 PM     Future Appointments   Date Time Provider Anika Campoverde   9/16/2019  6:00 PM [place] [facility] [place]   9/18/2019  4:30 PM Adela Ayala, PT [facility] [place]

## 2019-09-16 ENCOUNTER — HOSPITAL ENCOUNTER (OUTPATIENT)
Dept: PHYSICAL THERAPY | Age: 64
Discharge: HOME | End: 2019-09-16
Payer: MEDICARE

## 2019-09-16 PROCEDURE — 97110 THERAPEUTIC EXERCISES: CPT | Performed by: PHYSICAL THERAPIST

## 2019-09-16 NOTE — PROGRESS NOTES
PHYSICAL THERAPY - DAILY TREATMENT NOTE    Patient Name: Nidia Llanos        Date: 2019  : 1955   YES Patient  Verified  Visit #:     Insurance: Payor: Roberth Johnston / Plan: VA MEDICARE PART A & B / Product Type: Medicare /      In time: 6:00 Out time: 6:40   Total Treatment Time: 40     BCBS/Medicare Time Tracking (below)   Total Timed Codes (min): 40 1:1 Treatment Time: 40     TREATMENT AREA =  Low back pain [M54.5]    SUBJECTIVE  Pain Level (on 0 to 10 scale):  1  / 10   Medication Changes/New allergies or changes in medical history, any new surgeries or procedures? NO    If yes, update Summary List   Subjective Functional Status/Changes:  []  No changes reported     Pt reports back pain has been more manageable.           Modalities Rationale:     decrease pain and increase tissue extensibility to improve patient's ability to prevent soreness   min [] Estim, type/location:                                      []  att     []  unatt     []  w/US     []  w/ice    []  w/heat    min []  Mechanical Traction: type/lbs                   []  pro   []  sup   []  int   []  cont    []  before manual    []  after manual    min []  Ultrasound, settings/location:      min []  Iontophoresis w/ dexamethasone, location:                                               []  take home patch       []  in clinic   - min []  Ice     []  Heat    location/position:     min []  Vasopneumatic Device, press/temp:     min []  Other:    [] Skin assessment post-treatment (if applicable):    []  intact    []  redness- no adverse reaction     []redness - adverse reaction:        35 min Therapeutic Exercise:  []  See flow sheet   Rationale:      increase ROM, increase strength and improve coordination to improve the patients ability to self-correct posture       5 min Therapeutic Activity: Education with practice on neutral spine sitting and body mechanics  -  See flow sheet   Rationale:    increase ROM and improve coordination to improve the patients ability to increase movement/positional tolerance    Billed With/As:   [] TE   [] TA   [] Neuro   [] Self Care Patient Education: [x] Review HEP    [] Progressed/Changed HEP based on:   [] positioning   [] body mechanics   [] transfers   [] heat/ice application    [] other:      Other Objective/Functional Measures:    Pt able to manage forward lean in standing/walkaing once given VCs. He is using abdominals more effectively when standing/walking to reduce lumbar lordosis. Post Treatment Pain Level (on 0 to 10) scale:   0  / 10     ASSESSMENT  Assessment/Changes in Function:     Pt showing good form with therex and improved management of symptoms. He inquired about starting treadmill, but was discouraged due to increased lordotic forces when walking with increased speed. []  See Progress Note/Recertification   Patient will continue to benefit from skilled PT services to modify and progress therapeutic interventions, address functional mobility deficits, address ROM deficits, address strength deficits, analyze and address soft tissue restrictions, analyze and cue movement patterns, analyze and modify body mechanics/ergonomics and assess and modify postural abnormalities to attain remaining goals. Progress toward goals / Updated goals: Will reassess next session.      PLAN  [x]  Upgrade activities as tolerated YES Continue plan of care   []  Discharge due to :    []  Other:      Therapist: Mike Salmeron PT    Date: 9/16/2019 Time: 10:45 AM     Future Appointments   Date Time Provider Anika Campoverde   9/16/2019  6:00 PM [facility] [place]   9/18/2019  4:30 PM Trang Ayala, PT [facility] [place]

## 2019-09-18 ENCOUNTER — HOSPITAL ENCOUNTER (OUTPATIENT)
Dept: PHYSICAL THERAPY | Age: 64
Discharge: HOME | End: 2019-09-18
Payer: MEDICARE

## 2019-09-18 PROCEDURE — 97110 THERAPEUTIC EXERCISES: CPT | Performed by: PHYSICAL THERAPIST

## 2019-09-18 NOTE — PROGRESS NOTES
PHYSICAL THERAPY - DAILY TREATMENT NOTE    Patient Name: Prince Garg        Date: 2019  : 1955   YES Patient  Verified  Visit #:     Insurance: Payor: Monda Ganser / Plan: VA MEDICARE PART A & B / Product Type: Medicare /      In time: 6:00 Out time: 6:50   Total Treatment Time: 45     BCBS/Medicare Time Tracking (below)   Total Timed Codes (min): 45 1:1 Treatment Time: 45     TREATMENT AREA =  Low back pain [M54.5]    SUBJECTIVE  Pain Level (on 0 to 10 scale):  1  / 10   Medication Changes/New allergies or changes in medical history, any new surgeries or procedures? NO    If yes, update Summary List   Subjective Functional Status/Changes:  []  No changes reported     Pt reports he has been managing symptoms well.           Modalities Rationale:     decrease pain and increase tissue extensibility to improve patient's ability to prevent soreness   min [] Estim, type/location:                                      []  att     []  unatt     []  w/US     []  w/ice    []  w/heat    min []  Mechanical Traction: type/lbs                   []  pro   []  sup   []  int   []  cont    []  before manual    []  after manual    min []  Ultrasound, settings/location:      min []  Iontophoresis w/ dexamethasone, location:                                               []  take home patch       []  in clinic   - min []  Ice     []  Heat    location/position:     min []  Vasopneumatic Device, press/temp:     min []  Other:    [] Skin assessment post-treatment (if applicable):    []  intact    []  redness- no adverse reaction     []redness - adverse reaction:        45 min Therapeutic Exercise:  []  See flow sheet   Rationale:      increase ROM, increase strength and improve coordination to improve the patients ability to self-correct posture     Billed With/As:   [] TE   [] TA   [] Neuro   [] Self Care Patient Education: [x] Review HEP    [] Progressed/Changed HEP based on:   [] positioning   [] body mechanics   [] transfers   [] heat/ice application    [] other:      Other Objective/Functional Measures:    FOTO - 52    Pt demonstrates independence with HEP      Post Treatment Pain Level (on 0 to 10) scale:   0  / 10     ASSESSMENT  Assessment/Changes in Function:     Pt is progressing well with symptoms management. [x]  See DC Note      Progress toward goals / Updated goals:     Yousif Yee Note     PLAN  []  Upgrade activities as tolerated    [x]  Discharge due to : Progressing toward goals   []  Other:      Therapist: Barbara Kaiser PT    Date: 9/18/2019 Time: 10:45 AM     Future Appointments   Date Time Provider Anika Campoverde   9/18/2019  4:30 PM Souleymane Hall, PT [facility] [place]

## 2019-09-18 NOTE — PROGRESS NOTES
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 Comorbidities: HTN, coronary stent placement (2019), prior stent placement ()   Medications: Verified on Patient Summary List   Visits from [facility]: 7 Missed Visits: 0     Goal/Measure of Progress Goal Met? 1.  Pt to increase FOTO score from 55 to >/= 71. Status at last Eval: 55 Current Status: 52 no   2. Pt independent with high level HEP for hip flexibility and advanced core stabilization. Status at last Eval: - Current Status: Met yes   3. Pt able to resume walking for up to 30 minutes without any increase in lower back pain. Status at last Eval: Unable  Current Status: Met Progressing   4. Pt to manage pain to </= 1/10 with basic HEP for lumbar and hip flexibility and postural correction. Status at last Eval: 2-3/10 Current Status: 1/10 yes     Key Functional Changes/Progress: Pt was last seen in PT on 19, and reported minimal symptoms in lower back (1/10). Pt describes being able to stand and walk longer without increase LBP. He has been working on daily exercises at [facility] he notes helps manage symptoms. Pt has been able to resume twice/week cardiac rehab classes without LBP, but he has been avoiding the treadmill. He continues to stand with a forward lean due to hip flexor tightness and probable lumbar arthritis. Mr Jacky Currie is independent with HEP and it was mutually decided to discontinue formal PT and continue with HEP. Assessments/Recommendations: Discontinue therapy. Progressing towards or have reached established goals. If you have any questions/comments please contact us directly at [phone]. Thank you for allowing us to assist in the care of your patient. Therapist Signature:  Arlen Shell PT Date: 9/18/19   Reporting Period: 8/19/19 - 9/18/19 Time: 6:51 PM

## 2022-08-08 ENCOUNTER — APPOINTMENT (OUTPATIENT)
Dept: GENERAL RADIOLOGY | Age: 67
End: 2022-08-08
Attending: EMERGENCY MEDICINE
Payer: MEDICARE

## 2022-08-08 ENCOUNTER — HOSPITAL ENCOUNTER (EMERGENCY)
Age: 67
Discharge: HOME | End: 2022-08-08
Attending: EMERGENCY MEDICINE
Payer: MEDICARE

## 2022-08-08 VITALS
SYSTOLIC BLOOD PRESSURE: 126 MMHG | DIASTOLIC BLOOD PRESSURE: 85 MMHG | WEIGHT: 223 LBS | HEART RATE: 78 BPM | OXYGEN SATURATION: 96 % | RESPIRATION RATE: 25 BRPM | HEIGHT: 76 IN | TEMPERATURE: 97.7 F | BODY MASS INDEX: 27.16 KG/M2

## 2022-08-08 DIAGNOSIS — U07.1 COVID-19: Primary | ICD-10-CM

## 2022-08-08 LAB
FLUAV RNA SPEC QL NAA+PROBE: NOT DETECTED
FLUBV RNA SPEC QL NAA+PROBE: NOT DETECTED
SARS-COV-2, COV2: DETECTED

## 2022-08-08 PROCEDURE — 99283 EMERGENCY DEPT VISIT LOW MDM: CPT

## 2022-08-08 PROCEDURE — 87636 SARSCOV2 & INF A&B AMP PRB: CPT

## 2022-08-08 PROCEDURE — 71045 X-RAY EXAM CHEST 1 VIEW: CPT

## 2022-08-08 RX ORDER — NIRMATRELVIR AND RITONAVIR 150-100 MG
1 KIT ORAL 2 TIMES DAILY
Qty: 1 DOSE PACK | Refills: 0 | Status: SHIPPED | OUTPATIENT
Start: 2022-08-08 | End: 2022-08-13

## 2022-08-08 NOTE — Clinical Note
[address] [facility] EMERGENCY DEPT  [address] [address]  [phone]    Work/School Note    Date: 8/8/2022     To Whom It May concern:    Bowen Rico was evaluated by the following provider(s):  Attending Provider: Autumn Mortimer, MD.   [address] virus is suspected. Per the CDC guidelines we recommend home isolation until the following conditions are all met:    1. At least five days have passed since symptoms first appeared and/or had a close exposure,   2. After home isolation for five days, wearing a mask around others for the next five days,  3. At least 24 have passed since last fever without the use of fever-reducing medications and  4.  Symptoms (eg cough, shortness of breath) have improved      Sincerely,          Anayeli Bravo MD

## 2022-08-08 NOTE — Clinical Note
[address] [facility] EMERGENCY DEPT  [address] [id number] [phone]    Work/School Note    Date: 8/8/2022     To Whom It May concern:    Salty Montana was evaluated by the following provider(s):  Attending Provider: Syed Cottrell MD.   [place] virus is suspected. Per the CDC guidelines we recommend home isolation until the following conditions are all met:    1. At least five days have passed since symptoms first appeared and/or had a close exposure,   2. After home isolation for five days, wearing a mask around others for the next five days,  3. At least 24 have passed since last fever without the use of fever-reducing medications and  4.  Symptoms (eg cough, shortness of breath) have improved      Sincerely,          Alejandro Smith MD

## 2022-08-08 NOTE — ED TRIAGE NOTES
Pt c/o fever, chills, muscle aches, and nasal drainage since yesterday. Pt states to taking Excedrin for headache/fever with relief. Pt took two home test yesterday and both came back positive.

## 2022-08-08 NOTE — ED NOTES
Pt discharged home in stable condition reporting improvement in symptoms. Discharge instructions reviewed, questions answered, and patient verbalized understanding. Patient ambulatory our of department with a steady gait.

## 2022-08-08 NOTE — ED PROVIDER NOTES
68-year-old male with history of A. fib coronary artery disease status post stenting hypertension hypercholesterolemia presents emergency department with complaint of generalized malaise fatigue sneezing and coughing. Patient took 2 at home COVID test yesterday both of which were positive. He presents today asking questions about infusion of monoclonal antibodies.   No acute distress on arrival       Past Medical History:   Diagnosis Date    Anxiety     Atrial fibrillation (HCC)     Atrial tachycardia (HCC)     CAD (coronary artery disease)     Coronary stent     Depression     High cholesterol 03/15/2012    HTN (hypertension)     not currently    Hx of heart artery stent     Persistent insomnia     Ulcerative colitis ([facility] [address]        Past Surgical History:   Procedure Laterality Date    CARDIAC CATHETERIZATION  1/19/2018         CHEST TUBE      CORONARY ARTERY ANGIOGRAM  1/19/2018         HX COLONOSCOPY  02/2011    HX CORONARY STENT PLACEMENT      HX HERNIA REPAIR      LV ANGIOGRAPHY  1/19/2018         MODERATE SEDATION  1/19/2018              Family History:   Problem Relation Age of Onset    Hypertension Mother     Pulmonary Embolism Father     Hypertension Sister        Social History     Socioeconomic History    Marital status:      Spouse name: Not on file    Number of children: Not on file    Years of education: Not on file    Highest education level: Not on file   Occupational History    Not on file   Tobacco Use    Smoking status: Every Day     Packs/day: 1.00     Years: 50.00     Pack years: 50.00     Types: Cigarettes    Smokeless tobacco: Never   Vaping Use    Vaping Use: Never used   Substance and Sexual Activity    Alcohol use: Never     Alcohol/week: 0.8 standard drinks     Types: 1 Shots of liquor per week    Drug use: Never     Comment: just on occasion for PTSD    Sexual activity: Yes   Other Topics Concern    Not on file   Social History Narrative    ** Merged History Encounter ** Social Determinants of Health     Financial Resource Strain: Not on file   Food Insecurity: Not on file   Transportation Needs: Not on file   Physical Activity: Not on file   Stress: Not on file   Social Connections: Not on file   Intimate Partner Violence: Not on file   Housing Stability: Not on file         ALLERGIES: Penicillins and Codeine sulfate    Review of Systems   Constitutional:  Positive for activity change, chills, diaphoresis, fatigue and fever. Negative for appetite change and unexpected weight change. HENT:  Positive for congestion, postnasal drip and rhinorrhea. Negative for hearing loss, mouth sores, sinus pressure, sinus pain, sneezing and sore throat. Eyes: Negative. Respiratory:  Positive for cough. Cardiovascular: Negative. Gastrointestinal: Negative. Genitourinary: Negative. Musculoskeletal:  Positive for myalgias. Negative for arthralgias, back pain, gait problem and joint swelling. Neurological: Negative. Hematological: Negative. Vitals:    08/08/22 0854 08/08/22 0907   BP: 128/74    Pulse: 85    Resp: 26    Temp: 97.7 °F (36.5 °C)    SpO2: 98% 98%   Weight: 101.2 kg (223 lb)    Height: 6' 4\" (1.93 m)             Physical Exam  Vitals and nursing note reviewed. Constitutional:       General: He is not in acute distress. Appearance: He is ill-appearing. He is not toxic-appearing or diaphoretic. HENT:      Head: Normocephalic and atraumatic. Right Ear: Tympanic membrane, ear canal and external ear normal.      Left Ear: Tympanic membrane, ear canal and external ear normal.      Nose: Rhinorrhea present. No congestion. Mouth/Throat:      Pharynx: Oropharynx is clear. Posterior oropharyngeal erythema present. No oropharyngeal exudate. Eyes:      Conjunctiva/sclera: Conjunctivae normal.      Pupils: Pupils are equal, round, and reactive to light. Cardiovascular:      Rate and Rhythm: Normal rate and regular rhythm.       Pulses: Normal pulses. Heart sounds: Normal heart sounds. No murmur heard. No friction rub. No gallop. Pulmonary:      Effort: Pulmonary effort is normal. No respiratory distress. Breath sounds: Normal breath sounds. No stridor. No wheezing or rhonchi. Abdominal:      General: Abdomen is flat. Bowel sounds are normal. There is no distension. Palpations: Abdomen is soft. There is no mass. Tenderness: There is no abdominal tenderness. Hernia: No hernia is present. Musculoskeletal:         General: No swelling, tenderness, deformity or signs of injury. Normal range of motion. Cervical back: Normal range of motion and neck supple. Skin:     General: Skin is warm and dry. Capillary Refill: Capillary refill takes less than 2 seconds. Neurological:      General: No focal deficit present. Mental Status: He is alert. Mental status is at baseline. Psychiatric:         Mood and Affect: Mood normal.         Behavior: Behavior normal.         Thought Content: Thought content normal.         Judgment: Judgment normal.        MDM  Number of Diagnoses or Management Options  COVID-19  Diagnosis management comments: 58-year-old male with at home diagnosis of positive COVID-19, presents emergency room discussing possible monoclonal antibodies. Patient arrived no acute distress he did appear to be ill-appearing however he was afebrile saturation 96% on room air. Diagnosis and symptoms began 2 days ago. COVID-19 was confirmed in the emergency room. Lungs had some mild rhonchorous noises spread throughout is typical for COVID heart sounds are unremarkable abdomen soft nontender nondistended patient has a history of hyperlipidemia asthma prior A. fib stenting and meets criteria for high risk COVID-19. Review of his chart shows no contraindication to Paxil bid patient no longer taking alprazolam listed.   Reviewed and discussed course of Paxil bid versus mild Labe at this time with the omicron variant Paxil it seems to be more efficacious than the current monoclonal.  Patient discharged home with fall precautions and pack to prescription           Procedures

## 2022-08-09 ENCOUNTER — PATIENT OUTREACH (OUTPATIENT)
Dept: CASE MANAGEMENT | Age: 67
End: 2022-08-09

## 2022-08-09 NOTE — PROGRESS NOTES
Patient contacted regarding COVID-19 diagnosis. Discussed COVID-19 related testing which was available at this time. Test results were positive. Patient informed of results, if available? yes. Ambulatory Care Manager contacted the patient by telephone to perform post discharge assessment. Call within 2 business days of discharge: Yes Verified name and  with patient as identifiers. Provided introduction to self, and explanation of the CTN/ACM role, and reason for call due to risk factors for infection and/or exposure to COVID-19. Symptoms reviewed with patient who verbalized the following symptoms: fatigue, cough, and chills or shaking, sneezing, muscle aches and general malaise      Due to no new or worsening symptoms encounter was not routed to provider for escalation. Discussed follow-up appointments. If no appointment was previously scheduled, appointment scheduling offered:  yes. [facility] follow up appointment(s): No future appointments. Non-[facility] follow up appointment(s): patient will call physician office to schedule appointment    Interventions to address risk factors: Obtained and reviewed discharge summary and/or continuity of care documents and Education of patient/family/caregiver/guardian to support self-management-rest, fluids, proper diet, medications     Advance Care Planning:   Does patient have an Advance Directive: not on file. Educated patient about risk for severe COVID-19 due to risk factors according to CDC guidelines. ACM reviewed discharge instructions, medical action plan and red flag symptoms with the patient who verbalized understanding. Discussed COVID vaccination status: yes. Education provided on COVID-19 vaccination as appropriate. Discussed exposure protocols and quarantine with CDC Guidelines. Patient was given an opportunity to verbalize any questions and concerns and agrees to contact ACM or health care provider for questions related to their healthcare.     Reviewed and educated patient on any new and changed medications related to discharge diagnosis  Paxlovid     Was patient discharged with a pulse oximeter? no    ACM provided contact information. Plan for follow-up call in 3-5 days based on severity of symptoms and risk factors. Patient has been vaccinated and received booster x 2.

## 2022-08-12 ENCOUNTER — PATIENT OUTREACH (OUTPATIENT)
Dept: CASE MANAGEMENT | Age: 67
End: 2022-08-12

## 2022-08-12 NOTE — PROGRESS NOTES
Patient contacted regarding COVID-19 diagnosis. Discussed COVID-19 related testing which was available at this time. Test results were positive. Patient informed of results, if available? NA      Ambulatory Care Manager contacted the patient by telephone to perform follow-up assessment. Verified name and  with patient as identifiers. Patient has following risk factors of: no known risk factors. Symptoms reviewed with patient who verbalized the following symptoms: fatigue, cough, and headache . States cough is usually first thing in morning \"like I am clearing my throat\". Due to no new or worsening symptoms encounter was not routed to provider for escalation. Interventions to address risk factors: Assessment and support for treatment adherence and medication management-Paxlovid- denies any side effects but complaining of diarrhea \"or loose stools\" and fatigue. Educated patient about risk for severe COVID-19 due to risk factors according to CDC guidelines. ACM reviewed discharge instructions, medical action plan and red flag symptoms with the patient who verbalized understanding. Discussed COVID vaccination status: yes. Education provided on COVID-19 vaccination as appropriate. Discussed exposure protocols and quarantine with CDC Guidelines. Patient was given an opportunity to verbalize any questions and concerns and agrees to contact ACM or health care provider for questions related to their healthcare. Reviewed and educated patient on any new and changed medications related to discharge diagnosis     Was patient discharged with a pulse oximeter? no    ACM provided contact information. Plan for follow-up call in 5-7 days based on severity of symptoms and risk factors.

## 2022-08-19 ENCOUNTER — PATIENT OUTREACH (OUTPATIENT)
Dept: CASE MANAGEMENT | Age: 67
End: 2022-08-19

## 2022-08-19 NOTE — PROGRESS NOTES
Patient resolved from [address] Transitions episode on 8/19/22. Discussed COVID-19 related testing which was available at this time. Test results were positive. Patient informed of results, if available? yes     Patient/family has been provided the following resources and education related to COVID-19:                         Signs, symptoms and red flags related to COVID-19            CDC exposure and quarantine guidelines            [facility] exposure contact - [phone]            Contact for their local Department of Health                 Patient currently reports that the following symptoms have improved:  fever and congestion . No further outreach scheduled with this CTN/ACM/LPN/HC/ MA. Episode of Care resolved. Patient has this CTN/ACM/LPN/HC/MA contact information if future needs arise.

## 2024-04-26 NOTE — PROGRESS NOTES
HISTORY OF PRESENT ILLNESS  Rigoberto Tracy is a 70 y.o. male. ASSESSMENT and PLAN    Mr. Dom Rodriguez has known history of CAD. About 2008, he had LAD stent performed at [facility].  He had repeat stent performed in the LAD around 2014. He has history of anxiety and PTSD. In January 2018, he presented to [facility] and ultimately underwent repeat coronary angiography which showed mild LAD ISR, 60% ostial obtuse marginal branch lesion as well as proximal RCA 50% lesion. Continued medical therapy was recommended with tobacco cessation. He has difficulty tolerating statins because it causes diarrhea. From cardiac standpoint, he appears to be doing well. He remains active physically. He has not noted any significant changes in his exercise capacity. His dyspnea on exertion is stable. He has not had any exertional chest pains. His weight today is 239 pounds. He could not tolerate statins because it caused diarrhea when he was given Lipitor. After lengthy discussion, over 20 minutes total, he is willing to try Pravachol 10 mg daily. Again, part of the lengthy discussion involved necessary need to discontinue tobacco completely. I will plan on seeing him back in 6 months. Thank you. Encounter Diagnoses   Name Primary?  Chest pain, unspecified type Yes     current treatment plan is effective, no change in therapy  lab results and schedule of future lab studies reviewed with patient  reviewed diet, exercise and weight control  very strongly urged to quit smoking to reduce cardiovascular risk  cardiovascular risk and specific lipid/LDL goals reviewed  use of aspirin to prevent MI and TIA's discussed    HPI  Today, Mr. Hayley Ewing has no complaints of chest pains. He denies any changes in his exercise capacity. He does have baseline dyspnea on exertion which is without change. He denies any orthopnea or PND. He denies any palpitations or dizziness.   Unfortunately, he still smokes about 1 pack per day. Review of Systems   Respiratory: Negative for shortness of breath. Cardiovascular: Negative for chest pain, palpitations, orthopnea, claudication, leg swelling and PND. Psychiatric/Behavioral: The patient is nervous/anxious. All other systems reviewed and are negative. Physical Exam   Constitutional: He is oriented to person, place, and time. He appears well-developed and well-nourished. HENT:   Head: Normocephalic. Eyes: Conjunctivae are normal.   Neck: Neck supple. No JVD present. Carotid bruit is not present. No thyromegaly present. Cardiovascular: Normal rate and regular rhythm. Pulmonary/Chest: Breath sounds normal.   Abdominal: Bowel sounds are normal.   Musculoskeletal: He exhibits no edema. Neurological: He is alert and oriented to person, place, and time. Skin: Skin is warm and dry. Nursing note and vitals reviewed. PCP: Santana Hsu MD    History reviewed. No pertinent past medical history. History reviewed. No pertinent surgical history. The patient has a family history of    Social History   Substance Use Topics    Smoking status: None    Smokeless tobacco: None    Alcohol use None       No results found for: CHOL, CHOLX, CHLST, CHOLV, HDL, LDL, LDLC, DLDLP, TGLX, TRIGL, TRIGP, CHHD, CHHDX     BP Readings from Last 3 Encounters:   09/18/18 124/60        Pulse Readings from Last 3 Encounters:   09/18/18 63       Wt Readings from Last 3 Encounters:   09/18/18 108.4 kg (239 lb)         EKG: normal EKG, normal sinus rhythm, unchanged from previous tracings. 12